# Patient Record
Sex: MALE | Race: WHITE | Employment: STUDENT | URBAN - METROPOLITAN AREA
[De-identification: names, ages, dates, MRNs, and addresses within clinical notes are randomized per-mention and may not be internally consistent; named-entity substitution may affect disease eponyms.]

---
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
## 2018-12-17 ENCOUNTER — OFFICE VISIT (OUTPATIENT)
Dept: ALLERGY | Facility: CLINIC | Age: 18
End: 2018-12-17
Payer: COMMERCIAL

## 2018-12-17 ENCOUNTER — NURSE ONLY (OUTPATIENT)
Dept: ALLERGY | Facility: CLINIC | Age: 18
End: 2018-12-17
Payer: COMMERCIAL

## 2018-12-17 VITALS
OXYGEN SATURATION: 97 % | SYSTOLIC BLOOD PRESSURE: 130 MMHG | HEART RATE: 92 BPM | DIASTOLIC BLOOD PRESSURE: 70 MMHG | TEMPERATURE: 96 F

## 2018-12-17 DIAGNOSIS — Z91.018 FOOD ALLERGY: ICD-10-CM

## 2018-12-17 DIAGNOSIS — T78.1XXA ADVERSE FOOD REACTION, INITIAL ENCOUNTER: Primary | ICD-10-CM

## 2018-12-17 PROCEDURE — 99204 OFFICE O/P NEW MOD 45 MIN: CPT | Performed by: ALLERGY & IMMUNOLOGY

## 2018-12-17 PROCEDURE — 99212 OFFICE O/P EST SF 10 MIN: CPT | Performed by: ALLERGY & IMMUNOLOGY

## 2018-12-17 PROCEDURE — 95004 PERQ TESTS W/ALRGNC XTRCS: CPT | Performed by: ALLERGY & IMMUNOLOGY

## 2018-12-17 NOTE — PATIENT INSTRUCTIONS
Recs: Handouts on food allergies versus food intolerances provided and reviewed  Continue with lactaid milk  as he is tolerating a by history  Reviewed alternatives including soy or almond milk  Reviewed other products by lactaid  including ice creams and

## 2018-12-17 NOTE — PROGRESS NOTES
Pamella Case is a 19year old male. HPI:   Patient presents with:  Food Allergy: Mother thinks he is lactose intolerant. PT reports if he has a glass of milk or ice cream he has diarrhea, stomach bloating, cramping.  Cheese doesn't bother him all the Outpatient Medications:  Fluticasone Propionate (FLONASE) 50 MCG/ACT Nasal Suspension 2 sprays by Nasal route daily.  Disp: 1 Bottle Rfl: 3       Allergies:  No Known Allergies      ROS:     Allergic/Immuno:  See HPI  Cardiovascular:  Negative for irregular encounter diagnosis)    Clinical history includes GI symptoms with milk. By history patient tolerates Lactaid milk without issues or reactions. No history of respiratory symptoms or rashes with dairy or milk    History suggest lactose intolerance.   Mom p

## 2019-05-31 ENCOUNTER — OFFICE VISIT (OUTPATIENT)
Dept: INTERNAL MEDICINE CLINIC | Facility: CLINIC | Age: 19
End: 2019-05-31
Payer: COMMERCIAL

## 2019-05-31 VITALS
OXYGEN SATURATION: 99 % | WEIGHT: 232 LBS | BODY MASS INDEX: 31.42 KG/M2 | HEIGHT: 72 IN | DIASTOLIC BLOOD PRESSURE: 88 MMHG | SYSTOLIC BLOOD PRESSURE: 124 MMHG | HEART RATE: 82 BPM | RESPIRATION RATE: 12 BRPM | TEMPERATURE: 99 F

## 2019-05-31 DIAGNOSIS — Z00.00 ANNUAL PHYSICAL EXAM: Primary | ICD-10-CM

## 2019-05-31 DIAGNOSIS — K13.0 MUCOCELE OF LIP: ICD-10-CM

## 2019-05-31 DIAGNOSIS — Z23 NEED FOR VACCINATION: ICD-10-CM

## 2019-05-31 PROCEDURE — 90651 9VHPV VACCINE 2/3 DOSE IM: CPT | Performed by: INTERNAL MEDICINE

## 2019-05-31 PROCEDURE — 99395 PREV VISIT EST AGE 18-39: CPT | Performed by: INTERNAL MEDICINE

## 2019-05-31 PROCEDURE — 90471 IMMUNIZATION ADMIN: CPT | Performed by: INTERNAL MEDICINE

## 2019-05-31 PROCEDURE — 90734 MENACWYD/MENACWYCRM VACC IM: CPT | Performed by: INTERNAL MEDICINE

## 2019-05-31 PROCEDURE — 90472 IMMUNIZATION ADMIN EACH ADD: CPT | Performed by: INTERNAL MEDICINE

## 2019-05-31 NOTE — PROGRESS NOTES
Radha Mcelroy is a 19year old malePatient presents with:  Establish Care: Pt would like to establish with PCP. Reports has not had a PCP and previous MD was Pediatrician. Bump: Pt c/o bumb on lip x3 months.  Reports he has attempted to ethan is demetrio equivalent per week    Drug use: Never          REVIEW OF SYSTEMS:   GENERAL: feels well otherwise  EYES:denies blurred vision or double vision  HEENT: denies nasal congestion, sinus pain, ST, or sore throat; reports cyst on lip  LUNGS: denies shortness of

## 2019-06-13 ENCOUNTER — OFFICE VISIT (OUTPATIENT)
Dept: OTOLARYNGOLOGY | Facility: CLINIC | Age: 19
End: 2019-06-13
Payer: COMMERCIAL

## 2019-06-13 VITALS
TEMPERATURE: 97 F | WEIGHT: 230 LBS | DIASTOLIC BLOOD PRESSURE: 81 MMHG | HEIGHT: 72 IN | BODY MASS INDEX: 31.15 KG/M2 | SYSTOLIC BLOOD PRESSURE: 121 MMHG

## 2019-06-13 DIAGNOSIS — K13.0 MUCOCELE OF LOWER LIP: Primary | ICD-10-CM

## 2019-06-13 PROCEDURE — 99203 OFFICE O/P NEW LOW 30 MIN: CPT | Performed by: OTOLARYNGOLOGY

## 2019-06-13 PROCEDURE — 99212 OFFICE O/P EST SF 10 MIN: CPT | Performed by: OTOLARYNGOLOGY

## 2019-06-13 NOTE — PROGRESS NOTES
Riki Arana is a 19year old male. Patient presents with:  Lesion: inside lower left lip for 5-6 months       HISTORY OF PRESENT ILLNESS  6/13/2019  Patient presents with a lesion located on his lower lip.   This was first noticed 6 Months ago and has GI Negative Abdominal pain and diarrhea. Endocrine Negative Cold intolerance and heat intolerance. Neuro Negative Tremors. Psych Negative Anxiety and depression. Integumentary Negative Frequent skin infections, pigment change and rash.    Hema/Lym sprays by Nasal route daily. , Disp: 1 Bottle, Rfl: 3  ASSESSMENT AND PLAN    1.  Mucocele of lower lip  Due to the large size I do recommend excision of this under local anesthesia in the operating room where I have access to bipolar cautery he agrees and w

## 2019-06-24 NOTE — H&P
Ivet Arevalo is a 19year old male. Patient presents with:  Lesion: inside lower left lip for 5-6 months         HISTORY OF PRESENT ILLNESS  6/13/2019  Patient presents with a lesion located on his lower lip.   This was first noticed 6 Months ago and ha irregular heartbeat/palpitations and syncope. GI Negative Abdominal pain and diarrhea. Endocrine Negative Cold intolerance and heat intolerance. Neuro Negative Tremors. Psych Negative Anxiety and depression.    Integumentary Negative Frequent skin i Fluticasone Propionate (FLONASE) 50 MCG/ACT Nasal Suspension, 2 sprays by Nasal route daily. , Disp: 1 Bottle, Rfl: 3  ASSESSMENT AND PLAN     1. Mucocele of lower lip  Due to the large size I do recommend excision of this under local anesthesia in the oper

## 2019-06-28 ENCOUNTER — HOSPITAL ENCOUNTER (OUTPATIENT)
Facility: HOSPITAL | Age: 19
Setting detail: HOSPITAL OUTPATIENT SURGERY
Discharge: HOME | End: 2019-06-28
Attending: OTOLARYNGOLOGY | Admitting: OTOLARYNGOLOGY
Payer: COMMERCIAL

## 2019-06-28 VITALS
DIASTOLIC BLOOD PRESSURE: 80 MMHG | RESPIRATION RATE: 16 BRPM | OXYGEN SATURATION: 99 % | BODY MASS INDEX: 31.15 KG/M2 | HEART RATE: 83 BPM | WEIGHT: 230 LBS | HEIGHT: 72 IN | SYSTOLIC BLOOD PRESSURE: 125 MMHG

## 2019-06-28 DIAGNOSIS — K13.0 DISEASES OF LIPS: ICD-10-CM

## 2019-06-28 PROCEDURE — 40814 EXCISE/REPAIR MOUTH LESION: CPT | Performed by: OTOLARYNGOLOGY

## 2019-06-28 PROCEDURE — 0HB1XZZ EXCISION OF FACE SKIN, EXTERNAL APPROACH: ICD-10-PCS | Performed by: OTOLARYNGOLOGY

## 2019-06-28 RX ORDER — LIDOCAINE HYDROCHLORIDE AND EPINEPHRINE 10; 10 MG/ML; UG/ML
INJECTION, SOLUTION INFILTRATION; PERINEURAL AS NEEDED
Status: DISCONTINUED | OUTPATIENT
Start: 2019-06-28 | End: 2019-06-28 | Stop reason: HOSPADM

## 2019-06-28 NOTE — INTERVAL H&P NOTE
Pre-op Diagnosis: Diseases of lips [K13.0]    The above referenced H&P was reviewed by Esteban Vivas MD on 6/28/2019, the patient was examined and no significant changes have occurred in the patient's condition since the H&P was performed.   I discussed wit

## 2019-06-28 NOTE — OPERATIVE REPORT
Bandar Haynes  DATE OF SURGERY: 6/28/2019  PREOPERATIVE DIAGNOSIS:   lip mucocele  POSTOPERATIVE DIAGNOSIS:  Same.   OPERATIVE PROCEDURE:      Excision and reconstruction of 3 cm lip lesion consistent with lip mucoceles

## 2019-06-29 ENCOUNTER — TELEPHONE (OUTPATIENT)
Dept: OTOLARYNGOLOGY | Facility: CLINIC | Age: 19
End: 2019-06-29

## 2019-07-01 NOTE — TELEPHONE ENCOUNTER
Post follow up lip mucocele lip repair,patient stated he is doing fine but said the sutures fell off , and it is open  Feeling numbness on the site ,patient denies any fever of bleeding ,FRANCOK made aware and advised pt to keep the site clean and dry avoid ru

## 2019-08-07 ENCOUNTER — TELEPHONE (OUTPATIENT)
Dept: INTERNAL MEDICINE CLINIC | Facility: CLINIC | Age: 19
End: 2019-08-07

## 2019-08-07 NOTE — TELEPHONE ENCOUNTER
S/w mother Laney Alfred. Informed that order for HPV #2 is in the system and that #3 is due 4 weeks after. Mother will have pt call back to schedule a nurse visit.

## 2019-08-07 NOTE — TELEPHONE ENCOUNTER
Pt. Is due for his second HPV vaccine mom wants to know if there's an order already placed ph.  # [phone]   Routed to clinical

## 2019-08-13 ENCOUNTER — NURSE ONLY (OUTPATIENT)
Dept: INTERNAL MEDICINE CLINIC | Facility: CLINIC | Age: 19
End: 2019-08-13
Payer: COMMERCIAL

## 2019-08-13 DIAGNOSIS — Z23 NEED FOR HPV VACCINE: Primary | ICD-10-CM

## 2019-08-13 PROCEDURE — 90651 9VHPV VACCINE 2/3 DOSE IM: CPT | Performed by: INTERNAL MEDICINE

## 2019-08-13 PROCEDURE — 90471 IMMUNIZATION ADMIN: CPT | Performed by: INTERNAL MEDICINE

## 2019-08-13 NOTE — PROGRESS NOTES
Pt here today for 2nd/Final HPV Vaccine    Pt verfied name and     Pt tolerated injection well     Vaccine Record printed for pt

## 2019-12-23 ENCOUNTER — TELEPHONE (OUTPATIENT)
Dept: INTERNAL MEDICINE CLINIC | Facility: CLINIC | Age: 19
End: 2019-12-23

## 2019-12-23 ENCOUNTER — OFFICE VISIT (OUTPATIENT)
Dept: INTERNAL MEDICINE CLINIC | Facility: CLINIC | Age: 19
End: 2019-12-23
Payer: COMMERCIAL

## 2019-12-23 VITALS
DIASTOLIC BLOOD PRESSURE: 108 MMHG | SYSTOLIC BLOOD PRESSURE: 140 MMHG | TEMPERATURE: 99 F | OXYGEN SATURATION: 99 % | HEIGHT: 72 IN | WEIGHT: 255 LBS | BODY MASS INDEX: 34.54 KG/M2 | HEART RATE: 102 BPM

## 2019-12-23 DIAGNOSIS — Z00.00 ANNUAL PHYSICAL EXAM: ICD-10-CM

## 2019-12-23 DIAGNOSIS — Z11.3 SCREENING EXAMINATION FOR STD (SEXUALLY TRANSMITTED DISEASE): Primary | ICD-10-CM

## 2019-12-23 PROCEDURE — 99213 OFFICE O/P EST LOW 20 MIN: CPT | Performed by: INTERNAL MEDICINE

## 2019-12-23 PROCEDURE — 90651 9VHPV VACCINE 2/3 DOSE IM: CPT | Performed by: INTERNAL MEDICINE

## 2019-12-23 PROCEDURE — 90471 IMMUNIZATION ADMIN: CPT | Performed by: INTERNAL MEDICINE

## 2019-12-23 RX ORDER — DOXYCYCLINE HYCLATE 100 MG/1
100 CAPSULE ORAL 2 TIMES DAILY
Qty: 42 CAPSULE | Refills: 0 | Status: SHIPPED | OUTPATIENT
Start: 2019-12-23 | End: 2020-01-13

## 2019-12-23 RX ORDER — AZITHROMYCIN 500 MG/1
TABLET, FILM COATED ORAL
Qty: 4 TABLET | Refills: 0 | Status: SHIPPED | OUTPATIENT
Start: 2019-12-23 | End: 2019-12-23

## 2019-12-23 NOTE — PROGRESS NOTES
Justina Flor is a 22year old male. Patient presents with:  STD: Would like STD testing       HPI:   Justina Flor is a 22year old male who presents for: STD testing    Has noticed a few spots on the base of his penis.  They are small, sometimes like a Types: 1 Standard drinks or equivalent per week      Comment: social    Drug use: Never         REVIEW OF SYSTEMS:   GENERAL: feels well otherwise  : reports ED, rash. Denies penile discharge, pain, dysuria, urinary frequency.        EXAM:   BP (!) 140/10

## 2019-12-23 NOTE — TELEPHONE ENCOUNTER
Please call pharmacy-cancel azithromycin rx; instead would like him to have doxycycline bid x 21 days (rx was sent already)

## 2019-12-23 NOTE — TELEPHONE ENCOUNTER
Spoke to pharmacy and relayed Dr Elmer Blackwood message. They only show the doxycyline rx. Verified RX. They will fill doxycycline.

## 2019-12-24 ENCOUNTER — LAB ENCOUNTER (OUTPATIENT)
Dept: LAB | Age: 19
End: 2019-12-24
Attending: INTERNAL MEDICINE
Payer: COMMERCIAL

## 2019-12-24 DIAGNOSIS — Z11.3 SCREENING EXAMINATION FOR STD (SEXUALLY TRANSMITTED DISEASE): ICD-10-CM

## 2019-12-24 DIAGNOSIS — Z00.00 ANNUAL PHYSICAL EXAM: ICD-10-CM

## 2019-12-24 PROCEDURE — 87591 N.GONORRHOEAE DNA AMP PROB: CPT

## 2019-12-24 PROCEDURE — 80061 LIPID PANEL: CPT

## 2019-12-24 PROCEDURE — 36415 COLL VENOUS BLD VENIPUNCTURE: CPT

## 2019-12-24 PROCEDURE — 87389 HIV-1 AG W/HIV-1&-2 AB AG IA: CPT

## 2019-12-24 PROCEDURE — 86780 TREPONEMA PALLIDUM: CPT

## 2019-12-24 PROCEDURE — 87491 CHLMYD TRACH DNA AMP PROBE: CPT

## 2019-12-24 PROCEDURE — 82947 ASSAY GLUCOSE BLOOD QUANT: CPT

## 2019-12-26 ENCOUNTER — TELEPHONE (OUTPATIENT)
Dept: INTERNAL MEDICINE CLINIC | Facility: CLINIC | Age: 19
End: 2019-12-26

## 2019-12-26 NOTE — TELEPHONE ENCOUNTER
Dr. Gretel Pedraza, I spoke with patient and he feels about the same but thinks that rash had improved. He asks what it could have been because his girlfriend is going to get tested. He says he is still having erectile dysfunction issues.  He says he will work on TONE Matos

## 2019-12-26 NOTE — TELEPHONE ENCOUNTER
Please notify patient that his STD testing was normal; glucose and cholesterol was normal.     How is he feeling?

## 2019-12-26 NOTE — TELEPHONE ENCOUNTER
Likely a bacterial infection---like I said, this is not something that we can test for in blood or urine tests. It shows up as a rash. Nonetheless, she should be evaluated by her doctor.      In terms of the erectile dysfunction, would recommend that he c

## 2021-03-11 ENCOUNTER — TELEPHONE (OUTPATIENT)
Dept: PEDIATRICS CLINIC | Facility: CLINIC | Age: 21
End: 2021-03-11

## 2021-03-11 NOTE — TELEPHONE ENCOUNTER
Mom needs to know when patient received his last tetanus shot. He cut his finger while away at school and is getting it stitched up now. They would like to know.

## 2021-03-11 NOTE — TELEPHONE ENCOUNTER
Patient transferred to triage   TDap vaccine date 5/18/2020     Information provided to patient. No further concerns.

## 2021-03-11 NOTE — TELEPHONE ENCOUNTER
No VIK on file to speak with parent   Mom provided patient's contact info. Call attempt to patient, message left.  requested callback

## 2021-04-01 ENCOUNTER — TELEPHONE (OUTPATIENT)
Dept: INTERNAL MEDICINE CLINIC | Facility: CLINIC | Age: 21
End: 2021-04-01

## 2021-04-01 NOTE — TELEPHONE ENCOUNTER
Spoke with pt's mother, advised PCP physical exam, understanding verbalized. Call transferred to  for appt. To Dr. Manisha braxton - pt has appt with you 4/2/21 11:00AM.  Pt last seen here 12/23/19.

## 2021-04-01 NOTE — TELEPHONE ENCOUNTER
Are we able to schedule him for 12:00 for for 1 hour examination for his physical, we can focus on his ADHD symptoms as part of the physical.

## 2021-04-01 NOTE — TELEPHONE ENCOUNTER
Patient's daughter Fortino Aguilar is calling patient is away at college  He called her and said he feels he may have ADHD  Patient feels he needs to see someone for this    What specialist should the patient see?   Please call Fortino Aguilar [phone]

## 2021-04-02 ENCOUNTER — OFFICE VISIT (OUTPATIENT)
Dept: INTERNAL MEDICINE CLINIC | Facility: CLINIC | Age: 21
End: 2021-04-02
Payer: COMMERCIAL

## 2021-04-02 ENCOUNTER — LAB ENCOUNTER (OUTPATIENT)
Dept: LAB | Age: 21
End: 2021-04-02
Attending: INTERNAL MEDICINE
Payer: COMMERCIAL

## 2021-04-02 VITALS
BODY MASS INDEX: 35 KG/M2 | HEART RATE: 91 BPM | TEMPERATURE: 98 F | RESPIRATION RATE: 16 BRPM | OXYGEN SATURATION: 99 % | SYSTOLIC BLOOD PRESSURE: 124 MMHG | DIASTOLIC BLOOD PRESSURE: 84 MMHG | WEIGHT: 260 LBS

## 2021-04-02 DIAGNOSIS — Z13.1 SCREENING FOR DIABETES MELLITUS: ICD-10-CM

## 2021-04-02 DIAGNOSIS — R63.1 POLYDIPSIA: ICD-10-CM

## 2021-04-02 DIAGNOSIS — F32.9 REACTIVE DEPRESSION: ICD-10-CM

## 2021-04-02 DIAGNOSIS — Z00.00 ANNUAL PHYSICAL EXAM: Primary | ICD-10-CM

## 2021-04-02 DIAGNOSIS — F41.9 ANXIETY: ICD-10-CM

## 2021-04-02 DIAGNOSIS — Z00.00 ANNUAL PHYSICAL EXAM: ICD-10-CM

## 2021-04-02 DIAGNOSIS — F90.0 ATTENTION DEFICIT HYPERACTIVITY DISORDER (ADHD), PREDOMINANTLY INATTENTIVE TYPE: ICD-10-CM

## 2021-04-02 PROCEDURE — 99395 PREV VISIT EST AGE 18-39: CPT | Performed by: INTERNAL MEDICINE

## 2021-04-02 PROCEDURE — 83036 HEMOGLOBIN GLYCOSYLATED A1C: CPT

## 2021-04-02 PROCEDURE — 3074F SYST BP LT 130 MM HG: CPT | Performed by: INTERNAL MEDICINE

## 2021-04-02 PROCEDURE — 3079F DIAST BP 80-89 MM HG: CPT | Performed by: INTERNAL MEDICINE

## 2021-04-02 PROCEDURE — 96127 BRIEF EMOTIONAL/BEHAV ASSMT: CPT | Performed by: INTERNAL MEDICINE

## 2021-04-02 PROCEDURE — 85025 COMPLETE CBC W/AUTO DIFF WBC: CPT

## 2021-04-02 PROCEDURE — 84443 ASSAY THYROID STIM HORMONE: CPT

## 2021-04-02 PROCEDURE — 80053 COMPREHEN METABOLIC PANEL: CPT

## 2021-04-02 PROCEDURE — 36415 COLL VENOUS BLD VENIPUNCTURE: CPT

## 2021-04-02 NOTE — PATIENT INSTRUCTIONS
He was seen in clinic for annual physical examination. We did focus on your symptoms that are consistent with ADHD. We also discussed a little bit about your depressed mood, which seems to be due to your difficulties with your ADHD symptoms.     Our goal the clinic in 3 to 6 months for follow-up appointment.

## 2021-04-02 NOTE — H&P
Jerilyn Chilel is a 23year old male. HPI:   Patient presents with:  Physical: Pt would like to discuss possible dx of ADD as he has noted difficulty concentrating, impulsive and self destructive behaviors.  Also reports trouble sitting still and diffic past year. Feels like he his hypersensitive to touch and temperature. Has an eye twitch, as it seems like he is rolling his eyes.      As a kid, he was in special classes for reading in the past. Going through high school and college, didn't really get good COMPLETE  11/19/2013 19:22:27    scanned to media tab:   Isaiah Fidel [id number]; DOS 11-   • EXCISION LESION HEAD/NECK/FACE N/A 6/28/2019    Performed by Raudel Escalera MD at [facility] MAIN OR      Family History   Problem Relation Age of Onset   • Ara Mares Nail changes  Neuro: Weakness, Numbness, Paresthesias, Loss of Consciousness, Syncope, Dizziness, Headache, Falls  Psych:  Anxiety, Depression, Insomnia, Suicidal Ideation, Homicidal ideation, Memory Changes  Heme/Lymph: Bruising, Bleeding, Lymphadenopathy Latest Ref Range: 0.358 - 3.740 mIU/mL 2.190         ASSESSMENT/PLAN:       Mr. Keisha Sanchez is a 23year old male coming in for physical examination.     ADHD  History as above in HPI, in summary patient has had difficulty with concentration, focus, completing ta with depression, if this is a concomitant comorbid condition independent of ADHD. At that time, he would warrant therapy and possible antidepressant medication    Insomnia  Unclear if primary insomnia, or also due to his anxiety.   He sleeps about 4 hours Td/Tdap: Last Tdap assess at next visit  Zoster (60+):  Not indicated  HPV (19-26): UTD  Pneumococcal: Not indicated    Immunization History   Administered Date(s) Administered   • DTAP 12/07/1998, 02/10/1999, 04/13/1999, 04/26/2000, 10/28/2004   • HEP B/

## 2021-04-03 PROBLEM — F41.9 ANXIETY: Status: ACTIVE | Noted: 2021-04-03

## 2021-04-03 PROBLEM — F90.0 ATTENTION DEFICIT HYPERACTIVITY DISORDER (ADHD), PREDOMINANTLY INATTENTIVE TYPE: Status: ACTIVE | Noted: 2021-04-03

## 2021-04-03 PROBLEM — F32.9 REACTIVE DEPRESSION: Status: ACTIVE | Noted: 2021-04-03

## 2021-04-05 ENCOUNTER — TELEPHONE (OUTPATIENT)
Dept: INTERNAL MEDICINE CLINIC | Facility: CLINIC | Age: 21
End: 2021-04-05

## 2021-04-05 NOTE — TELEPHONE ENCOUNTER
Patient called back, tried to transfer call to triage. Phone call went straight to voicemail. Informed patient that nursing will have to call him back later.      [facility] # [phone]

## 2021-04-05 NOTE — TELEPHONE ENCOUNTER
Spoke with patient to relay MD message below; Patient verbalized understanding.  Pt did have concerns with high kidney function numbers as he states he drinks about a gallon of water and probably more since starting the Vyvanse in which we discuss dry mouth

## 2021-04-05 NOTE — TELEPHONE ENCOUNTER
Please notify the patient that I reviewed his blood work from 4/2/2021    A1c: Normal, no diabetes    Thyroid, blood counts within normal limits    Chemistry panel: Kidney function is slightly high, we should have him take aggressive oral hydration with wa

## 2021-04-06 NOTE — TELEPHONE ENCOUNTER
Patients son called just to verify that it is ok to talk to his mom about his lab results. Patients mom in on the verbal release.

## 2021-04-06 NOTE — TELEPHONE ENCOUNTER
2 energy drinks 300 mg each of caffeine, 600 a day for the past 2 years on a frequent basis. Prior to his blood draw, he did have 2 energy drinks as he only slept 1 hour prior to driving down from [place].     His ALT at 94 and creatinine 1.60 possibly re

## 2021-04-06 NOTE — TELEPHONE ENCOUNTER
Patients mom(Janelle) called and would like a copy of her sons latest blood work. She would like to  by Friday.     Please call to let her know when ready for     [phone]

## 2021-04-22 ENCOUNTER — TELEPHONE (OUTPATIENT)
Dept: INTERNAL MEDICINE CLINIC | Facility: CLINIC | Age: 21
End: 2021-04-22

## 2021-04-22 NOTE — TELEPHONE ENCOUNTER
Called patient regarding condition update with new start Vyvanse 30 mg    Left voicemail at mobile [phone]. I asked for a condition update regarding the Vyvanse. I did speak with the patient. For the first 3 days, the Vyvanse really helped.  He was

## 2021-04-27 ENCOUNTER — TELEPHONE (OUTPATIENT)
Dept: INTERNAL MEDICINE CLINIC | Facility: CLINIC | Age: 21
End: 2021-04-27

## 2021-04-27 NOTE — TELEPHONE ENCOUNTER
Patient is calling his school is requiring a letter stating he has been diagnosed with ADHD  Since he was prescribed Vyvanse they need a letter    Patient would like this added to Maggi    Tasked to nursing

## 2021-04-27 NOTE — TELEPHONE ENCOUNTER
Please notify the patient that I sent a letter through my chart. He should review it and make sure that it is appropriate to submit to his school.     Under Letters or Communications tab

## 2021-04-27 NOTE — TELEPHONE ENCOUNTER
Spoke with patient to relay MD message below; Patient verbalized understanding. Pt did not have any questions or concerns at this time. He did review the letter while on phone and states that is al he needs.

## 2021-05-13 ENCOUNTER — TELEPHONE (OUTPATIENT)
Dept: INTERNAL MEDICINE CLINIC | Facility: CLINIC | Age: 21
End: 2021-05-13

## 2021-05-13 NOTE — TELEPHONE ENCOUNTER
Received outside hospital records from [address] [place], [facility]    On 5/7/2021, patient presents to the emergency department at [address] with anxiety and shortness of breath.   He was in his Zoom call when he was suppo

## 2021-05-14 NOTE — TELEPHONE ENCOUNTER
Sent records for scanning dept - Records will be in pts EMR under Media Tab in the next upcoming days.

## 2021-05-18 ENCOUNTER — TELEPHONE (OUTPATIENT)
Dept: INTERNAL MEDICINE CLINIC | Facility: CLINIC | Age: 21
End: 2021-05-18

## 2021-05-18 NOTE — TELEPHONE ENCOUNTER
Mother, Fortino Aguilar, stopped in today to drop off release of information forms to be faxed to two different locations.      One Release of Information Request:  From [facility].  Fax #   Ph # [phone]    Second Release of I

## 2021-05-21 ENCOUNTER — TELEPHONE (OUTPATIENT)
Dept: INTERNAL MEDICINE CLINIC | Facility: CLINIC | Age: 21
End: 2021-05-21

## 2021-05-21 NOTE — TELEPHONE ENCOUNTER
I reviewed the outside records that were sent to me:    Patient was transferred to [address] system for pneumomediastinum and pneumopericardium.   Outside hospital was unable to perform a barium esophagram for possible esophageal perforation, thus h

## 2021-05-24 ENCOUNTER — TELEPHONE (OUTPATIENT)
Dept: INTERNAL MEDICINE CLINIC | Facility: CLINIC | Age: 21
End: 2021-05-24

## 2021-05-24 NOTE — TELEPHONE ENCOUNTER
To MD:  The above refill request is for a controlled substance. Please review pended medication order. Print and sign for staff to fax to pharmacy or prescribe electronically. 90 day pended.      Vyvanse 40 mg Q day     Last office visit: 4/2/2021

## 2021-05-24 NOTE — TELEPHONE ENCOUNTER
Patient is calling to request a refill on his Vyvanse. Patient states he was put on the medication two months ago with a dosage of 30 which was then increased to 40 mg. Patient states the 40 mg is working well. Patient is hoping to have this done today.

## 2021-05-24 NOTE — TELEPHONE ENCOUNTER
Disc included in records. Attempted to call medical records to determine if they are able to take a disc. Unable to contact medical records due to being closed at this time. Disc left at Dr. Adan Rodriguez nurse desk. Other records sent to scanning.

## 2021-05-25 NOTE — PATIENT INSTRUCTIONS
You were seen in clinic for posthospitalization follow-up after pneumo mediastinum and pneumopericardium at [facility]. Thankfully, all work-up returned negative and you had improved clinically.   Because of your ongoing symptoms, we do recommend a repeat

## 2021-05-25 NOTE — PROGRESS NOTES
Chief Complaint:   Patient presents with: Follow - Up: reports that he still feels like theres air going into his neck.      HPI:     Mr. Antonio Lloyd is a 23year old male past medical history of ADHD, hemorrhoids who presents today for posthospitalization follo esophagram for possible esophageal perforation, thus he was transferred to [facility] for thoracic surgery consultation. He was transferred on room air without distress.   He had subcutaneous emphysema which has improved, though still having some in bilateral [facility] Fracture of humerus, left, closed 2012    healed   • Hemorrhoids    • Knee effusion, right 09/08/2011   • Superficial bruising of lower leg 09/08/2011     Past Surgical History:   Procedure Laterality Date   • ELECTROCARDIOGRAM, COMPLETE  11/19/2013 19:22: Cough, Sputum, Wheezing, Shortness of breath  Gastrointestinal: Nausea, Vomiting, Diarrhea, Constipation, Pain, Heartburn, Dysphagia, Bloody stools, Tarry stools  Genitourinary: Dysmenorrhea, Dysuria, Urinary Frequency, Hematuria, Urinary Incontinence, Urg HPI      ASSESSMENT AND PLAN:       Mr. Manish Ramirez is a 23year old male past medical history of ADHD, hemorrhoids who presents today for posthospitalization follow-up.     Pneumomediastinum  Pneumopericardium  Work-up reviewed as above, unclear cause of his pne drinks    ADHD  -Continued on Vyvanse for now at 40 mg  -I discussed with him that Vyvanse can be contributing to his blood pressure, for which we should discuss the risks and benefits for continuing Vyvanse.          Orders This Visit:  Orders Placed This

## 2021-05-25 NOTE — TELEPHONE ENCOUNTER
Michelle Kent for condition:    There was no definitive etiology of the pneumomediastinum. No signs of it before. He still feels like air is coming back into. He is starting to get a pressure in the throat, but not as bad as the initial presentation.

## 2021-05-26 ENCOUNTER — OFFICE VISIT (OUTPATIENT)
Dept: INTERNAL MEDICINE CLINIC | Facility: CLINIC | Age: 21
End: 2021-05-26
Payer: COMMERCIAL

## 2021-05-26 VITALS
TEMPERATURE: 99 F | WEIGHT: 256 LBS | HEART RATE: 101 BPM | BODY MASS INDEX: 34.67 KG/M2 | DIASTOLIC BLOOD PRESSURE: 88 MMHG | SYSTOLIC BLOOD PRESSURE: 152 MMHG | OXYGEN SATURATION: 98 % | HEIGHT: 72 IN

## 2021-05-26 DIAGNOSIS — R03.0 ELEVATED BLOOD PRESSURE READING: ICD-10-CM

## 2021-05-26 DIAGNOSIS — J98.2 PNEUMOMEDIASTINUM (HCC): Primary | ICD-10-CM

## 2021-05-26 DIAGNOSIS — I31.9 PNEUMOPERICARDIUM: ICD-10-CM

## 2021-05-26 PROCEDURE — 3079F DIAST BP 80-89 MM HG: CPT | Performed by: INTERNAL MEDICINE

## 2021-05-26 PROCEDURE — 3008F BODY MASS INDEX DOCD: CPT | Performed by: INTERNAL MEDICINE

## 2021-05-26 PROCEDURE — 3077F SYST BP >= 140 MM HG: CPT | Performed by: INTERNAL MEDICINE

## 2021-05-26 PROCEDURE — 99215 OFFICE O/P EST HI 40 MIN: CPT | Performed by: INTERNAL MEDICINE

## 2021-05-26 NOTE — TELEPHONE ENCOUNTER
Spoke with the medical records department who confirmed that they do accept CD's/disk. Since the CD is at our office, we can either mail it directly to [facility]; attn: Medical Records or drop it off. Dr. Ivey  will drop off disk.  MD has it in hi

## 2021-05-27 ENCOUNTER — TELEPHONE (OUTPATIENT)
Dept: INTERNAL MEDICINE CLINIC | Facility: CLINIC | Age: 21
End: 2021-05-27

## 2021-05-27 ENCOUNTER — HOSPITAL ENCOUNTER (OUTPATIENT)
Dept: GENERAL RADIOLOGY | Facility: HOSPITAL | Age: 21
Discharge: HOME | End: 2021-05-27
Attending: INTERNAL MEDICINE
Payer: COMMERCIAL

## 2021-05-27 DIAGNOSIS — I31.9 PNEUMOPERICARDIUM: ICD-10-CM

## 2021-05-27 DIAGNOSIS — J98.2 PNEUMOMEDIASTINUM (HCC): ICD-10-CM

## 2021-05-27 PROCEDURE — 71046 X-RAY EXAM CHEST 2 VIEWS: CPT | Performed by: INTERNAL MEDICINE

## 2021-05-27 NOTE — TELEPHONE ENCOUNTER
Please notify the patient I reviewed his two-view chest x-ray from 5/27/2021:    No air was noted around the esophagus or heart. It was resulted as normal examination. This is good news as the area seems to have resolved from the last hospitalization.

## 2021-06-05 ENCOUNTER — LAB ENCOUNTER (OUTPATIENT)
Dept: LAB | Facility: HOSPITAL | Age: 21
End: 2021-06-05
Attending: INTERNAL MEDICINE
Payer: COMMERCIAL

## 2021-06-05 DIAGNOSIS — R03.0 ELEVATED BLOOD PRESSURE READING: ICD-10-CM

## 2021-06-05 PROCEDURE — 84244 ASSAY OF RENIN: CPT

## 2021-06-05 PROCEDURE — 82533 TOTAL CORTISOL: CPT

## 2021-06-05 PROCEDURE — 82088 ASSAY OF ALDOSTERONE: CPT

## 2021-06-05 PROCEDURE — 85025 COMPLETE CBC W/AUTO DIFF WBC: CPT

## 2021-06-05 PROCEDURE — 80053 COMPREHEN METABOLIC PANEL: CPT

## 2021-06-05 PROCEDURE — 36415 COLL VENOUS BLD VENIPUNCTURE: CPT

## 2021-06-05 PROCEDURE — 81001 URINALYSIS AUTO W/SCOPE: CPT

## 2021-06-14 ENCOUNTER — PATIENT MESSAGE (OUTPATIENT)
Dept: INTERNAL MEDICINE CLINIC | Facility: CLINIC | Age: 21
End: 2021-06-14

## 2021-06-14 DIAGNOSIS — R79.89 ELEVATED LFTS: Primary | ICD-10-CM

## 2021-06-15 NOTE — TELEPHONE ENCOUNTER
I reviewed the blood work from 6/5/2021    CMP: AST 41, ALT 91; notable potassium 3.7 normal; BUN and creatinine have improved to normal range  CBC within normal limits  Renin 4.1–0.1 above the reference range  –Aldosterone 11.4, within normal limits  –Cor

## 2021-06-15 NOTE — TELEPHONE ENCOUNTER
From: Riki Arana  To: Mendel Nagy MD  Sent: 6/14/2021 6:13 PM CDT  Subject: Test Results Tika Daley,    What do you think about my lab results? Are the liver numbers pretty bad?  Give me a call if you can to talk about them [phone]

## 2021-06-29 NOTE — TELEPHONE ENCOUNTER
Current refill request refused due to refill is either a duplicate request or has active refills at the pharmacy. Check previous templates.     Requested Prescriptions     Refused Prescriptions Disp Refills   • Lisdexamfetamine Dimesylate (VYVANSE) 40 MG O

## 2021-08-24 ENCOUNTER — TELEPHONE (OUTPATIENT)
Dept: INTERNAL MEDICINE CLINIC | Facility: CLINIC | Age: 21
End: 2021-08-24

## 2021-08-25 NOTE — TELEPHONE ENCOUNTER
To MD:  The above refill request is for a controlled substance. Please review pended medication order. Print and sign for staff to fax to pharmacy or prescribe electronically.     Last office visit: 5/26/2021 ()  Last time refill sent and quanti

## 2021-09-24 ENCOUNTER — TELEPHONE (OUTPATIENT)
Dept: INTERNAL MEDICINE CLINIC | Facility: CLINIC | Age: 21
End: 2021-09-24

## 2021-09-24 NOTE — TELEPHONE ENCOUNTER
Pt. Called stating his VyVanse is due to be refilled tomorrow. He is leaving for out of town today and Pharmacy won't fill today without permission from Dr. Juliana Bonilla. Pt. askinf if we can call [facility] on [address] and ok the refill for today.   Pt. Can be reac

## 2021-10-19 ENCOUNTER — PATIENT MESSAGE (OUTPATIENT)
Dept: INTERNAL MEDICINE CLINIC | Facility: CLINIC | Age: 21
End: 2021-10-19

## 2021-10-20 RX ORDER — LISDEXAMFETAMINE DIMESYLATE CAPSULES 40 MG/1
40 CAPSULE ORAL DAILY
Qty: 30 CAPSULE | Refills: 0 | OUTPATIENT
Start: 2021-10-20 | End: 2021-11-19

## 2021-10-20 RX ORDER — BUPROPION HYDROCHLORIDE 150 MG/1
TABLET ORAL
Qty: 90 TABLET | Refills: 3 | Status: SHIPPED | OUTPATIENT
Start: 2021-10-20 | End: 2022-01-20

## 2021-10-20 NOTE — TELEPHONE ENCOUNTER
Addressed in [facility] message today. Therefore, refill is not appropriate. After 3 days, can then start the Wellbutrin which I had sent earlier today.

## 2021-10-20 NOTE — TELEPHONE ENCOUNTER
To Dr Michelet Wilson to please clarify  It looks like this refill request for vyvanse came in yesterday  I see you gave patient instructions today about vyvanse and bupropion     Pt already should have a refill on file at pharmacy for vyvanse from 90 day panel eRXd by Dr Subha Rowan with fill date of 10/25  Should that be cancelled? Pt supposed to stop vyvanse altogether?

## 2021-10-20 NOTE — TELEPHONE ENCOUNTER
From: Kayleigh Mckay  To: Shahram Terrell MD  Sent: 10/19/2021 11:34 PM CDT  Subject: Vyvanse prescription     Hey Dr Radha Deluna been on the 40mg of Vyvanse for a few months now, and the effects have diminished if not gotten worse.      I started trevor

## 2021-10-20 NOTE — TELEPHONE ENCOUNTER
Washout Vyvanse over the next 3 days. Then start Wellbutrin that I sent over to the pharmacy per my chart note.

## 2022-01-19 NOTE — PROGRESS NOTES
Chief Complaint:   Patient presents with:   Follow - Up: pt here for general check up in regards to medication management   Covid: pt c/o fatigueness since getting his covid vaccine    HPI:     Mr. Helga Clement is a 19year old male PMHX ADHD, hemorrhoids coming i Procedure Laterality Date   • ELECTROCARDIOGRAM, COMPLETE  11/19/2013 19:22:27    scanned to media tab:   Argelia Dejesus 39-; DOS 11-     Social History:  Social History    Tobacco Use      Smoking status: Never Smoker      Smokeless tobac Numbness, Paresthesias, Loss of Consciousness, Syncope, Dizziness, Headache, Falls  Psych:  Anxiety, Depression, Insomnia, Suicidal Ideation, Homicidal ideation, Memory Changes  Heme/Lymph: Bruising, Bleeding, Lymphadenopathy  Endocrine: Polyuria, Polydipsi 37 U/L    Alkaline Phosphatase 74 45 - 117 U/L    Bilirubin, Total 0.9 0.1 - 2.0 mg/dL    Total Protein 7.8 6.4 - 8.2 g/dL    Albumin 4.0 3.4 - 5.0 g/dL    Globulin  3.8 2.8 - 4.4 g/dL    A/G Ratio 1.1 1.0 - 2.0    FASTING Yes      *Note: Due to a large nu possible inadequate control of ADHD.   - Will perfor a trial of reduced Wellbutrin 150 mg once a day  - Continue with conservative management of frequent reminders such as taking notes ad setting alarms    Anxiety and Depression  Worsening over the last few secondary work-up.  -Check blood pressure twice a day at various points during the day to evaluate for possible whitecoat hypertension  - Secondary work-up unremarkable  - BP today at goal today     Elevated creatinine  Improved on the inpatient hospitaliz

## 2022-01-19 NOTE — PATIENT INSTRUCTIONS
You were seen in clinic today for follow-up of your anxiety and ADHD symptoms. Today, we discussed the ongoing use of Wellbutrin. We recommended:    -Reducing the Wellbutrin dose to 1 tablet (150 mg) once a day.   The hope here is that the memory issues i

## 2022-02-23 ENCOUNTER — TELEPHONE (OUTPATIENT)
Dept: INTERNAL MEDICINE CLINIC | Facility: CLINIC | Age: 22
End: 2022-02-23

## 2022-02-24 RX ORDER — BUPROPION HYDROCHLORIDE 150 MG/1
TABLET ORAL
Qty: 30 TABLET | Refills: 0 | OUTPATIENT
Start: 2022-02-24

## 2022-02-24 RX ORDER — BUPROPION HYDROCHLORIDE 150 MG/1
150 TABLET ORAL DAILY
Qty: 30 TABLET | Refills: 11 | Status: SHIPPED | OUTPATIENT
Start: 2022-02-24

## 2022-02-24 NOTE — TELEPHONE ENCOUNTER
To Mario Rivas for consult, as this dose was just adjusted post LOV 1/20/22 (lowered from BID to 1 tab every day)     S/w pt - he states \"I'm feeling the best I have felt in a long time\" denies anymore s/e, states irritability has gotten much better and his memory has improved. Pt wishes to stay on this dose. Please advise if ok to cont.     Also Last alt 91 ast 41 on 6/5/21

## 2023-10-30 ENCOUNTER — TELEPHONE (OUTPATIENT)
Dept: INTERNAL MEDICINE CLINIC | Facility: CLINIC | Age: 23
End: 2023-10-30

## 2023-10-30 DIAGNOSIS — Z00.00 ANNUAL PHYSICAL EXAM: ICD-10-CM

## 2023-10-30 DIAGNOSIS — R79.89 ELEVATED LFTS: Primary | ICD-10-CM

## 2023-10-30 NOTE — TELEPHONE ENCOUNTER
Patient calling to request blood work prior to:    [x] physical    [] check-up      [] other    Patient uses:  [x] [facility] [] [facility]       [facility] location:       Fax #:    Patient prefers to be notified once labs are placed by: [x] phone call  [] my chart message     Please call patient mother when order in place    Tasked to nursing

## 2023-11-14 ENCOUNTER — LAB ENCOUNTER (OUTPATIENT)
Dept: LAB | Facility: HOSPITAL | Age: 23
End: 2023-11-14
Attending: INTERNAL MEDICINE
Payer: COMMERCIAL

## 2023-11-14 DIAGNOSIS — R79.89 ELEVATED LFTS: ICD-10-CM

## 2023-11-14 DIAGNOSIS — Z00.00 ANNUAL PHYSICAL EXAM: ICD-10-CM

## 2023-11-14 LAB
ALBUMIN SERPL-MCNC: 4.6 G/DL (ref 3.2–4.8)
ALBUMIN/GLOB SERPL: 1.5 {RATIO} (ref 1–2)
ALP LIVER SERPL-CCNC: 75 U/L
ALT SERPL-CCNC: 76 U/L
ANION GAP SERPL CALC-SCNC: 8 MMOL/L (ref 0–18)
AST SERPL-CCNC: 30 U/L (ref ?–34)
BASOPHILS # BLD AUTO: 0.07 X10(3) UL (ref 0–0.2)
BASOPHILS NFR BLD AUTO: 1.1 %
BILIRUB SERPL-MCNC: 0.6 MG/DL (ref 0.3–1.2)
BUN BLD-MCNC: 17 MG/DL (ref 9–23)
BUN/CREAT SERPL: 15.2 (ref 10–20)
CALCIUM BLD-MCNC: 9.9 MG/DL (ref 8.7–10.4)
CHLORIDE SERPL-SCNC: 105 MMOL/L (ref 98–112)
CHOLEST SERPL-MCNC: 153 MG/DL (ref ?–200)
CO2 SERPL-SCNC: 29 MMOL/L (ref 21–32)
CREAT BLD-MCNC: 1.12 MG/DL
DEPRECATED RDW RBC AUTO: 34.4 FL (ref 35.1–46.3)
EGFRCR SERPLBLD CKD-EPI 2021: 93 ML/MIN/1.73M2 (ref 60–?)
EOSINOPHIL # BLD AUTO: 0.15 X10(3) UL (ref 0–0.7)
EOSINOPHIL NFR BLD AUTO: 2.3 %
ERYTHROCYTE [DISTWIDTH] IN BLOOD BY AUTOMATED COUNT: 11.6 % (ref 11–15)
FASTING PATIENT LIPID ANSWER: YES
FASTING STATUS PATIENT QL REPORTED: YES
GLOBULIN PLAS-MCNC: 3.1 G/DL (ref 2.8–4.4)
GLUCOSE BLD-MCNC: 85 MG/DL (ref 70–99)
HCT VFR BLD AUTO: 46 %
HDLC SERPL-MCNC: 52 MG/DL (ref 40–59)
HGB BLD-MCNC: 16 G/DL
IMM GRANULOCYTES # BLD AUTO: 0.04 X10(3) UL (ref 0–1)
IMM GRANULOCYTES NFR BLD: 0.6 %
LDLC SERPL CALC-MCNC: 82 MG/DL (ref ?–100)
LYMPHOCYTES # BLD AUTO: 1.8 X10(3) UL (ref 1–4)
LYMPHOCYTES NFR BLD AUTO: 28 %
MCH RBC QN AUTO: 28.7 PG (ref 26–34)
MCHC RBC AUTO-ENTMCNC: 34.8 G/DL (ref 31–37)
MCV RBC AUTO: 82.6 FL
MONOCYTES # BLD AUTO: 0.51 X10(3) UL (ref 0.1–1)
MONOCYTES NFR BLD AUTO: 7.9 %
NEUTROPHILS # BLD AUTO: 3.87 X10 (3) UL (ref 1.5–7.7)
NEUTROPHILS # BLD AUTO: 3.87 X10(3) UL (ref 1.5–7.7)
NEUTROPHILS NFR BLD AUTO: 60.1 %
NONHDLC SERPL-MCNC: 101 MG/DL (ref ?–130)
OSMOLALITY SERPL CALC.SUM OF ELEC: 295 MOSM/KG (ref 275–295)
PLATELET # BLD AUTO: 251 10(3)UL (ref 150–450)
POTASSIUM SERPL-SCNC: 4.1 MMOL/L (ref 3.5–5.1)
PROT SERPL-MCNC: 7.7 G/DL (ref 5.7–8.2)
RBC # BLD AUTO: 5.57 X10(6)UL
SODIUM SERPL-SCNC: 142 MMOL/L (ref 136–145)
TRIGL SERPL-MCNC: 103 MG/DL (ref 30–149)
TSI SER-ACNC: 0.99 MIU/ML (ref 0.55–4.78)
VLDLC SERPL CALC-MCNC: 16 MG/DL (ref 0–30)
WBC # BLD AUTO: 6.4 X10(3) UL (ref 4–11)

## 2023-11-14 PROCEDURE — 36415 COLL VENOUS BLD VENIPUNCTURE: CPT

## 2023-11-14 PROCEDURE — 80053 COMPREHEN METABOLIC PANEL: CPT

## 2023-11-14 PROCEDURE — 85025 COMPLETE CBC W/AUTO DIFF WBC: CPT

## 2023-11-14 PROCEDURE — 84443 ASSAY THYROID STIM HORMONE: CPT

## 2023-11-14 PROCEDURE — 80061 LIPID PANEL: CPT

## 2023-11-16 ENCOUNTER — TELEPHONE (OUTPATIENT)
Dept: INTERNAL MEDICINE CLINIC | Facility: CLINIC | Age: 23
End: 2023-11-16

## 2023-11-16 DIAGNOSIS — R74.01 ELEVATED TRANSAMINASE LEVEL: Primary | ICD-10-CM

## 2023-11-16 NOTE — TELEPHONE ENCOUNTER
Please notify pt that his liver enzymes have been slightly elevated; I would ask that he get an ultrasound of his liver to evaluate this further. I know he has an upcoming appt with Dr. Casandra Singer, so hopefully this can get done before then---and we can discuss next steps.

## 2023-11-17 NOTE — TELEPHONE ENCOUNTER
[facility] SERVICES message sent with DR. Kely Orellana message

## 2023-12-30 NOTE — PATIENT INSTRUCTIONS
- You were seen in clinic for regular annual check-up.  We did review your most recent set of blood work with overall improvement of your liver test, good control of your cholesterol, good control of your sugar levels.    Overall, your doing a great job with targeting weight loss over time.  Even if we are not exercising regularly, optimizing nutrition and opting for more healthy options seems to have promoted weight loss from the last visit.  Keep up the good work  - Incorporate exercise to help build muscle, cardiovascular conditioning as this can help promote weight loss further    We did discuss cutting down on nicotine.  You are off to a good start  - This may be a potential side effect causing sexual dysfunction as we discussed.  Monitor for improvement of the symptoms as you cut down on the nicotine  - Try to cut down by about 10-20% every week until we get 0    Alcohol may be contributing to your increased liver numbers.  This is overall improved likely as result of your weight loss  - The ultrasound did show fatty liver disease that we can reverse over time  - It is reassuring that your cholesterol numbers are normal    We will repeat a nonfasting liver tests in about 3-4 months.  Lets continue incorporating these good healthy habits you have been successful with.  Hopefully the liver numbers normalize by this time.    You likely have a periumbilical hernia.  It is very small, and easily reduces on its own  - No need for surgical repair at this time, monitor for now  - Be sure to lift with your legs and try to minimize excessively heavy lifting which can worsen hernias over time.    -Lets continue trying to target weight loss over time with good nutrition, exercise as able  - Continue managing your anxiety and depression.  Follow with therapy if needed in the future  -We discussed the potential side effects of long-term stimulant use which include high blood pressure, chest pain, palpitations, unintended  weight loss due to poor appetite, insomnia, increased anxiety.  We should do periodic check ins to make sure that it is still safe to continue stimulant medication.  The goal is to control symptoms while minimizing side effects with the lowest effective dose and the shortest duration of time.  - Please continue to eat a varied diet including recommended servings of vegetables, fruits, and low fat dairy. Minimize high saturated fats (such as fast foods) and high sugar intake (such as soda)  - We recommend 150 minutes of moderate intensity exercise (brisk walking, swimming) weekly to maintain your current weight.  Targeted weight loss will require more vigorous exercise or more than 150 minutes/week.    Return to clinic in 1 year for annual physical examination

## 2023-12-30 NOTE — H&P
Cabrera Galloway is a 23 year old male.    HPI:     Chief Complaint   Patient presents with    Physical    Pain     Back and abdominal, possible hernia     Mr. Galloway is a 23 year old male past medical history of ADHD, anxiety and depression, history of pneumomediastinum presents today for annual physical examination    Doing overall well. He has good balance with worklife balance.  Does have hernia in the lower abdominal area.  Was evaluated by general surgery recommended no repair at this time.    Back pain ongoing very manageable. Frequency is a couple times a week. He notices to the area.     He does feel physically dependent on nicotine. He has been drinking more alcohol 8 drinks per week beer/whiskey.    Anxiety and depression is much better. No longer the wellbutrin, Sleep is 6.5-8 hrs.    I did review the patient's past medical history, past surgical history, family history, social history and updated as below    SocHX  - Home: as above  - Work: Mechanical engineering - enjoying job.  - Sexual Activity: inconsistent protection - GF for 2 years  - Hobbies: car things, business on the side   - Nutrition: beef, cutting back on junk food. Bread, lunch meat for lunch. Veggies and fruits on occasion - lettuce, peppers. Drinking a lot water.  - Physical Activity: since last visit - trying to resume exercise. Sedentary.       HISTORY:  Past Medical History:   Diagnosis Date    Abrasion, knee 09/08/2011    ADHD     Anxiety     Depression     Secondary to ADHD    Fracture of humerus, left, closed 2012    healed    Hemorrhoids     Knee effusion, right 09/08/2011    Superficial bruising of lower leg 09/08/2011      Past Surgical History:   Procedure Laterality Date    ELECTROCARDIOGRAM, COMPLETE  11/19/2013 19:22:27    scanned to media tab:  Cabrera Galloway ITZEL 10-; DOS 11-      Family History   Problem Relation Age of Onset    Diabetes Other     Hypertension Father     Cancer Maternal Grandmother          leukemia    Uterine Cancer Maternal Grandmother     Heart Disease Paternal Grandfather     Asthma Neg     Lipids Neg       Social History:   Social History     Socioeconomic History    Marital status: Single   Tobacco Use    Smoking status: Never    Smokeless tobacco: Never   Vaping Use    Vaping Use: Former   Substance and Sexual Activity    Alcohol use: Yes     Alcohol/week: 1.0 standard drink of alcohol     Types: 1 Standard drinks or equivalent per week     Comment: social    Drug use: Not Currently     Types: Cannabis        Medications (Active prior to today's visit):  Current Outpatient Medications   Medication Sig Dispense Refill    Tadalafil (CIALIS) 2.5 MG Oral Tab Take by mouth.         Allergies:  No Known Allergies      ROS:   Positive Findings indicated in BOLD    Constitutional: Fever, Chills, Weight Gain, Weight Loss, Night Sweats, Fatigue, Malaise  ENT/Mouth:  Hearing Changes, Ear Pain, Nasal Congestion, Sinus Pain, Hoarseness, Sore throat, Rhinorrhea, Swallowing Difficulty  Eyes: Eye Pain, Swelling, Redness, Foreign Body, Discharge, Vision Changes  Cardiovascular: Chest Pain, SOB, PND, Dyspnea on Exertion, Orthopnea, Claudication, Edema, Palpitations  Respiratory: Cough, Sputum, Wheezing, Shortness of breath  Gastrointestinal: Nausea, Vomiting, Diarrhea, Constipation, Pain, Heartburn, Dysphagia, Bloody stools, Tarry stools  Genitourinary: Dysmenorrhea, Dysuria, Urinary Frequency, Hematuria, Urinary Incontinence, Urgency,  Flank Pain  Musculoskeletal: Arthralgias, Myalgias, Joint Swelling, Joint Stiffness, Back Pain, Neck Pain  Integumentary: Skin Lesions, Pruritis, Hair Changes, Jaundice, Nail changes  Neuro: Weakness, Numbness, Paresthesias, Loss of Consciousness, Syncope, Dizziness, Headache, Falls  Psych: Anxiety, Depression, Insomnia, Suicidal Ideation, Homicidal ideation, Memory Changes  Heme/Lymph: Bruising, Bleeding, Lymphadenopathy  Endocrine: Polyuria, Polydipsia, Temperature  Intolerance    PHYSICAL EXAM:   Vital Signs:  Blood pressure 124/75, pulse 66, temperature 97.7 °F (36.5 °C), height 6' (1.829 m), weight 249 lb (112.9 kg), SpO2 99%.     Constitutional: No acute distress. Alert and oriented x 3.  Eyes: EOMI, PERRLA, clear sclera b/l  HENT: NCAT, Moist mucous membranes, Oropharynx without erythema or exudates  Neck: No JVD, no thyromegaly  Cardiovascular: S1, S2, no S3, no S4, Regular rate and rhythm, No murmurs/gallops/rubs.   Vascular: Equal pulses 2+  Respiratory: Clear to auscultation bilaterally.  No wheezes/rales/rhonchi  Gastrointestinal: Soft, nontender, nondistended. Positive bowel sounds x 4. No rebound tenderness. No hepatomegaly, No splenomegaly  + Periumbilical left lower quadrant hernia.  Reducible spontaneously  Genitourinary: No CVA tenderness bilaterally  Neurologic: No focal neurological deficits, CN II-XII intact, light touch intact, MSK Strength 5/5 and symmetric in all extremities, normal gait  Musculoskeletal: Full range of motion of all extremities, no clubbing/swelling/edema  Skin: No lesions, No erythema, no jaundice, Cap Refill < 2s  Psychiatric: Appropriate mood and affect  Heme/Lymph/Immune: No cervical/axillary/inguinal LAD      DATA REVIEWED   Labs:    Recent Results (from the past 8760 hour(s))   CBC W/ DIFFERENTIAL    Collection Time: 11/14/23  8:33 AM   Result Value Ref Range    WBC 6.4 4.0 - 11.0 x10(3) uL    RBC 5.57 4.30 - 5.70 x10(6)uL    HGB 16.0 13.0 - 17.5 g/dL    HCT 46.0 39.0 - 53.0 %    MCV 82.6 80.0 - 100.0 fL    MCH 28.7 26.0 - 34.0 pg    MCHC 34.8 31.0 - 37.0 g/dL    RDW-SD 34.4 (L) 35.1 - 46.3 fL    RDW 11.6 11.0 - 15.0 %    .0 150.0 - 450.0 10(3)uL    Neutrophil Absolute Prelim 3.87 1.50 - 7.70 x10 (3) uL    Neutrophil Absolute 3.87 1.50 - 7.70 x10(3) uL    Lymphocyte Absolute 1.80 1.00 - 4.00 x10(3) uL    Monocyte Absolute 0.51 0.10 - 1.00 x10(3) uL    Eosinophil Absolute 0.15 0.00 - 0.70 x10(3) uL    Basophil Absolute 0.07  0.00 - 0.20 x10(3) uL    Immature Granulocyte Absolute 0.04 0.00 - 1.00 x10(3) uL    Neutrophil % 60.1 %    Lymphocyte % 28.0 %    Monocyte % 7.9 %    Eosinophil % 2.3 %    Basophil % 1.1 %    Immature Granulocyte % 0.6 %     *Note: Due to a large number of results and/or encounters for the requested time period, some results have not been displayed. A complete set of results can be found in Results Review.       Recent Results (from the past 8760 hour(s))   Comp Metabolic Panel (14) [E]    Collection Time: 11/14/23  8:33 AM   Result Value Ref Range    Glucose 85 70 - 99 mg/dL    Sodium 142 136 - 145 mmol/L    Potassium 4.1 3.5 - 5.1 mmol/L    Chloride 105 98 - 112 mmol/L    CO2 29.0 21.0 - 32.0 mmol/L    Anion Gap 8 0 - 18 mmol/L    BUN 17 9 - 23 mg/dL    Creatinine 1.12 0.70 - 1.30 mg/dL    BUN/CREA Ratio 15.2 10.0 - 20.0    Calcium, Total 9.9 8.7 - 10.4 mg/dL    Calculated Osmolality 295 275 - 295 mOsm/kg    eGFR-Cr 93 >=60 mL/min/1.73m2    ALT 76 (H) 10 - 49 U/L    AST 30 <=34 U/L    Alkaline Phosphatase 75 45 - 117 U/L    Bilirubin, Total 0.6 0.3 - 1.2 mg/dL    Total Protein 7.7 5.7 - 8.2 g/dL    Albumin 4.6 3.2 - 4.8 g/dL    Globulin  3.1 2.8 - 4.4 g/dL    A/G Ratio 1.5 1.0 - 2.0    Patient Fasting for CMP? Yes      *Note: Due to a large number of results and/or encounters for the requested time period, some results have not been displayed. A complete set of results can be found in Results Review.     Ultrasound liver 1/11/2024  Impression   CONCLUSION:  1. Hepatic steatosis.  No focal hepatic lesions.  2. Normal gallbladder ultrasound.  Normal common duct.  3. No free fluid hepatorenal fossa. No hydronephrosis right kidney.       ASSESSMENT/PLAN:       Periumbilical hernia  Appreciated on examination, but spontaneously reduces  - Will monitor for now, can consider elective repair if enlargement or symptomatic.     Anxiety and Depression  Worsening over the last few months, no SI or HI. Seems to be generalized  but concerned about memory impairment. Fatigue and difficulty with concentration  - Re-established with his therapist  - Continue with coping mechanisms  -Overall doing well, off of Wellbutrin     Elevated LFTs  Most recent ALT 11/14/2023, 76  - Did undergo ultrasound liver shows hepatic steatosis  - Cholesterol seems to be well-controlled  - Would advise starting weight loss over time with good nutrition and exercise  - Try to minimize or avoid alcohol altogether.  - Plan to repeat CMP in 3-6 months    ADHD  Previously on Vyvanse 40 mg, but did not have good benefit.  Was switched to Wellbutrin in October 2021.  -No longer requiring medications at this time.  Overall well-controlled    Pneumomediastinum  Pneumopericardium  Work-up reviewed from last clinic visit.  Esophagram was negative for esophageal perforation.  Only potential contributing factors based on patient's history is height, and prior use of vaping for which she subsequently stopped.  He did recall history of excessive drinking needing to vomiting and hard sneezing.  May be an element of systemic hypertension for which the Vyvanse could be contributing factor?  -Repeat chest x-ray 5/27: Within normal limits.  -Avoid vigorous strenuous activities.  -Patient should stop vaping and any smoking  -Has clinically resolved.     Elevated blood pressure  Noted elevated blood pressure in the clinical setting back in 2019.  His blood pressure was improved prior to starting Vyvanse.  He remains asymptomatic.  Though concerning that he has high blood pressure at such a young age.  Want to secondary work-up.  -Check blood pressure twice a day at various points during the day to evaluate for possible whitecoat hypertension  - Secondary work-up unremarkable  - BP today at goal today     Elevated creatinine  Improved on the inpatient hospitalization review as above  -Repeat kidney function levels with CMP as above, has resolved  -He has been minimizing the use of  caffeine, energy drinks  - This has resolved     Erectile dysfunction  He feels may be psychological - over thinking  - on Cialis which can cause palpitations.  - Will try to cut down on nicotine, alcohol         Orders This Visit:  Orders Placed This Encounter   Procedures    Comp Metabolic Panel (14) [E]       Meds This Visit:  Requested Prescriptions      No prescriptions requested or ordered in this encounter       Imaging & Referrals:  None       Health Maintenance  HTN Screen: BP at goal  DM Screen: A1c at goal  HLD Screen: Check fasting lipid panel at next visit  HCV Screen: Considered low risk  HIV Screen: considered low risk  G/C/Syphilis: Considered low risk    Colon Cancer Screening (50-70): Not indicated  Prostate Cancer Screening: (50-70): Not indicated  Lung Cancer Screening (55-79 with 30 p/year and active < 15 years): Not indicated  AAA Screening (65-75 Hx of smoking): Not indicated    Influenza: Annually   Td/Tdap: Last Tdap assess at next visit  Zoster (60+): Not indicated  HPV (19-26): UTD  Pneumococcal: Not indicated    Immunization History   Administered Date(s) Administered    Covid-19 Vaccine Pfizer 30 mcg/0.3 ml 05/22/2021, 06/22/2021    DTAP 12/07/1998, 02/10/1999, 04/13/1999, 04/26/2000, 10/28/2004    HEP B/HIB 12/07/1998, 02/10/1999, 01/19/2000    Hpv Virus Vaccine 9 Amanda Im 05/31/2019, 08/13/2019, 12/23/2019    IPV 12/07/1998, 02/10/1999, 04/26/2000, 10/28/2004    Influenza 11/28/2007, 11/12/2008, 10/27/2009, 11/20/2012    Influenza Virus Vaccine, H1N1 10/27/2009    MMR 10/20/1999, 10/28/2004    Meningococcal-Menactra 08/07/2014    Meningococcal-Menveo 10-55 YEARS 05/31/2019    Meningococcal-Menveo 2month-55yr 05/31/2019    Rotavirus 3 Dose 12/07/1998, 01/04/1999, 02/10/1999    TD 03/12/2021    TDAP 05/18/2010    Varicella 01/19/2000, 05/18/2010     Return to clinic in 12 months for checkup      Teo Castano MD, 01/11/24, 9:51 PM

## 2024-01-03 ENCOUNTER — HOSPITAL ENCOUNTER (OUTPATIENT)
Dept: ULTRASOUND IMAGING | Age: 24
Discharge: HOME | End: 2024-01-03
Attending: INTERNAL MEDICINE
Payer: COMMERCIAL

## 2024-01-03 DIAGNOSIS — R74.01 ELEVATED TRANSAMINASE LEVEL: ICD-10-CM

## 2024-01-11 ENCOUNTER — HOSPITAL ENCOUNTER (OUTPATIENT)
Dept: ULTRASOUND IMAGING | Age: 24
Discharge: HOME | End: 2024-01-11
Attending: INTERNAL MEDICINE
Payer: COMMERCIAL

## 2024-01-11 ENCOUNTER — OFFICE VISIT (OUTPATIENT)
Dept: INTERNAL MEDICINE CLINIC | Facility: CLINIC | Age: 24
End: 2024-01-11

## 2024-01-11 VITALS
DIASTOLIC BLOOD PRESSURE: 75 MMHG | TEMPERATURE: 98 F | BODY MASS INDEX: 33.72 KG/M2 | HEIGHT: 72 IN | SYSTOLIC BLOOD PRESSURE: 124 MMHG | WEIGHT: 249 LBS | HEART RATE: 66 BPM | OXYGEN SATURATION: 99 %

## 2024-01-11 DIAGNOSIS — Z13.29 SCREENING FOR THYROID DISORDER: ICD-10-CM

## 2024-01-11 DIAGNOSIS — F41.9 ANXIETY: ICD-10-CM

## 2024-01-11 DIAGNOSIS — Z00.00 ANNUAL PHYSICAL EXAM: Primary | ICD-10-CM

## 2024-01-11 DIAGNOSIS — R79.89 ELEVATED LFTS: ICD-10-CM

## 2024-01-11 DIAGNOSIS — Z13.0 SCREENING FOR DEFICIENCY ANEMIA: ICD-10-CM

## 2024-01-11 DIAGNOSIS — Z13.220 SCREENING FOR LIPOID DISORDERS: ICD-10-CM

## 2024-01-11 DIAGNOSIS — Z13.1 SCREENING FOR DIABETES MELLITUS: ICD-10-CM

## 2024-01-11 DIAGNOSIS — F90.0 ATTENTION DEFICIT HYPERACTIVITY DISORDER (ADHD), PREDOMINANTLY INATTENTIVE TYPE: ICD-10-CM

## 2024-01-11 PROCEDURE — 3078F DIAST BP <80 MM HG: CPT | Performed by: INTERNAL MEDICINE

## 2024-01-11 PROCEDURE — 3074F SYST BP LT 130 MM HG: CPT | Performed by: INTERNAL MEDICINE

## 2024-01-11 PROCEDURE — 99395 PREV VISIT EST AGE 18-39: CPT | Performed by: INTERNAL MEDICINE

## 2024-01-11 PROCEDURE — 76705 ECHO EXAM OF ABDOMEN: CPT | Performed by: INTERNAL MEDICINE

## 2024-01-11 PROCEDURE — 3008F BODY MASS INDEX DOCD: CPT | Performed by: INTERNAL MEDICINE

## 2024-01-11 RX ORDER — TADALAFIL 2.5 MG/1
TABLET ORAL
COMMUNITY

## 2024-06-14 ENCOUNTER — HOSPITAL ENCOUNTER (OUTPATIENT)
Age: 24
Discharge: HOME | End: 2024-06-14
Attending: EMERGENCY MEDICINE

## 2024-06-14 VITALS
SYSTOLIC BLOOD PRESSURE: 140 MMHG | OXYGEN SATURATION: 100 % | DIASTOLIC BLOOD PRESSURE: 74 MMHG | HEART RATE: 91 BPM | TEMPERATURE: 99 F | RESPIRATION RATE: 16 BRPM

## 2024-06-14 DIAGNOSIS — R50.9 FEVER OF UNKNOWN ORIGIN: Primary | ICD-10-CM

## 2024-06-14 LAB
#MXD IC: 0.7 X10ˆ3/UL (ref 0.1–1)
BUN BLD-MCNC: 14 MG/DL (ref 7–18)
CHLORIDE BLD-SCNC: 101 MMOL/L (ref 98–112)
CO2 BLD-SCNC: 28 MMOL/L (ref 21–32)
CREAT BLD-MCNC: 1.2 MG/DL
EGFRCR SERPLBLD CKD-EPI 2021: 86 ML/MIN/1.73M2 (ref 60–?)
GLUCOSE BLD-MCNC: 115 MG/DL (ref 70–99)
HCT VFR BLD AUTO: 41.2 %
HCT VFR BLD CALC: 43 %
HGB BLD-MCNC: 14.5 G/DL
ISTAT IONIZED CALCIUM FOR CHEM 8: 1.2 MMOL/L (ref 1.12–1.32)
LYMPHOCYTES # BLD AUTO: 1.3 X10ˆ3/UL (ref 1–4)
LYMPHOCYTES NFR BLD AUTO: 21.2 %
MCH RBC QN AUTO: 28.4 PG (ref 26–34)
MCHC RBC AUTO-ENTMCNC: 35.2 G/DL (ref 31–37)
MCV RBC AUTO: 80.8 FL (ref 80–100)
MIXED CELL %: 11.1 %
NEUTROPHILS # BLD AUTO: 4.2 X10ˆ3/UL (ref 1.5–7.7)
NEUTROPHILS NFR BLD AUTO: 67.7 %
PLATELET # BLD AUTO: 140 X10ˆ3/UL (ref 150–450)
POCT INFLUENZA A: NEGATIVE
POCT INFLUENZA B: NEGATIVE
POTASSIUM BLD-SCNC: 3.9 MMOL/L (ref 3.6–5.1)
RBC # BLD AUTO: 5.1 X10ˆ6/UL
SARS-COV-2 RNA RESP QL NAA+PROBE: NOT DETECTED
SODIUM BLD-SCNC: 137 MMOL/L (ref 136–145)
WBC # BLD AUTO: 6.2 X10ˆ3/UL (ref 4–11)

## 2024-06-14 PROCEDURE — 85025 COMPLETE CBC W/AUTO DIFF WBC: CPT | Performed by: EMERGENCY MEDICINE

## 2024-06-14 PROCEDURE — 36415 COLL VENOUS BLD VENIPUNCTURE: CPT

## 2024-06-14 PROCEDURE — 86618 LYME DISEASE ANTIBODY: CPT | Performed by: EMERGENCY MEDICINE

## 2024-06-14 PROCEDURE — 99213 OFFICE O/P EST LOW 20 MIN: CPT

## 2024-06-14 PROCEDURE — 99204 OFFICE O/P NEW MOD 45 MIN: CPT

## 2024-06-14 PROCEDURE — 87502 INFLUENZA DNA AMP PROBE: CPT | Performed by: EMERGENCY MEDICINE

## 2024-06-14 PROCEDURE — 80047 BASIC METABLC PNL IONIZED CA: CPT

## 2024-06-14 RX ORDER — DOXYCYCLINE HYCLATE 100 MG/1
100 CAPSULE ORAL 2 TIMES DAILY
Qty: 20 CAPSULE | Refills: 0 | Status: SHIPPED | OUTPATIENT
Start: 2024-06-14 | End: 2024-06-24

## 2024-06-14 NOTE — ED INITIAL ASSESSMENT (HPI)
Pt here with intermittent fevers since Tuesday, reports 2 weeks ago had 2 ticks on him. Denies was bitten.

## 2024-06-14 NOTE — ED PROVIDER NOTES
Patient Seen in: [facility]      History     Chief Complaint   Patient presents with    Fever     Stated Complaint: possible high fever    Subjective:   HPI    The patient is a 23-year-old male with no significant past medical history who presents now with fever, joint pain.  The patient states he began experience fever as high as 102 for the last 2 days.  Patient has had mild nasal congestion.  The patient denies any sore throat, cough, urinary complaints.  Patient states he has had diffuse joint pain.  Patient states that he was in [place] a few weeks ago and found multiple ticks on his body.  He states he never removed an engorged tick but did have multiple small ticks on his body which he removed.  Patient denies any rash.  Patient is concerned about possible Lyme disease    Objective:   Past Medical History:    Abrasion, knee    ADHD    Anxiety    Depression    Secondary to ADHD    Fracture of humerus, left, closed    healed    Hemorrhoids    Knee effusion, right    Superficial bruising of lower leg              Past Surgical History:   Procedure Laterality Date    Electrocardiogram, complete  11/19/2013 19:22:27    scanned to media tab:  Cabrera Galloway 10-; VIRIDIANA 11-                Social History     Socioeconomic History    Marital status: Single   Tobacco Use    Smoking status: Never    Smokeless tobacco: Never   Vaping Use    Vaping status: Former   Substance and Sexual Activity    Alcohol use: Yes     Alcohol/week: 1.0 standard drink of alcohol     Types: 1 Standard drinks or equivalent per week     Comment: social    Drug use: Not Currently     Types: Cannabis     Social Determinants of Health      Received from [facility], [facility]    Financial Resource Strain    [facility]    [facility] Needs    [email or web address]    Social Connections              Review of Systems    Positive for  stated complaint: possible high fever  Other systems are as noted in HPI.  Constitutional and vital signs reviewed.      All other systems reviewed and negative except as noted above.    Physical Exam     ED Triage Vitals [06/14/24 0926]   /74   Pulse 91   Resp 16   Temp 98.5 °F (36.9 °C)   Temp src Oral   SpO2 100 %   O2 Device None (Room air)       Current Vitals:   Vital Signs  BP: 140/74  Pulse: 91  Resp: 16  Temp: 98.5 °F (36.9 °C)  Temp src: Oral    Oxygen Therapy  SpO2: 100 %  O2 Device: None (Room air)            Physical Exam    Constitutional: Well-developed well-nourished in no acute distress  Head: Normocephalic, no swelling or tenderness  Eyes: Nonicteric sclera, no conjunctival injection  ENT: TMs are clear and flat bilaterally.  There is no posterior pharyngeal erythema  Chest: Clear to auscultation, no tenderness  Cardiovascular: Regular rate and rhythm without murmur  Abdomen: Soft, nontender and nondistended  Neurologic: Patient is awake, alert and oriented ×3.  The patient's motor strength is 5 out of 5 and symmetric in the upper and lower extremities bilaterally  Extremities: No focal swelling or tenderness  Skin: No pallor, no redness or warmth to the touch      ED Course     Labs Reviewed   POCT CBC - Abnormal; Notable for the following components:       Result Value    PLT .0 (*)     All other components within normal limits   POCT ISTAT CHEM8 CARTRIDGE - Abnormal; Notable for the following components:    ISTAT Glucose 115 (*)     All other components within normal limits   RAPID SARS-COV-2 BY PCR - Normal   POCT FLU TEST - Normal    Narrative:     This assay is a rapid molecular in vitro test utilizing nucleic acid amplification of influenza A and B viral RNA.   LYME DISEASE, TOTAL AB W RFLX             Patient's negative COVID, negative flu, norm is unremarkable.  The patient verbalizes an understandingal white blood cell count reviewed with the patient.  Patient is concerned  about the possibility of Lyme's disease.  Titers were sent.  The risk and benefits of initiating treatment for possible Lyme's were discussed.  The patient prefers treatment.  Recommend stopping antibiotics if the Lyme lab work         MDM      adult fever including viral syndrome, influenza, urinary tract infection, pneumonia                                     Medical Decision Making      Disposition and Plan     Clinical Impression:  1. Fever of unknown origin         Disposition:  Discharge  6/14/2024 10:05 am    Follow-up:  Laverne Bolivar DO  [address] [place] [id number]  [phone]      As needed          Medications Prescribed:  Discharge Medication List as of 6/14/2024 10:07 AM        START taking these medications    Details   doxycycline 100 MG Oral Cap Take 1 capsule (100 mg total) by mouth 2 (two) times daily for 10 days., Normal, Disp-20 capsule, R-0

## 2024-06-19 LAB — B BURGDOR IGG+IGM SER QL: NEGATIVE

## 2024-07-01 ENCOUNTER — MED REC SCAN ONLY (OUTPATIENT)
Dept: INTERNAL MEDICINE CLINIC | Facility: CLINIC | Age: 24
End: 2024-07-01

## 2024-12-17 ENCOUNTER — TELEPHONE (OUTPATIENT)
Dept: INTERNAL MEDICINE CLINIC | Facility: CLINIC | Age: 24
End: 2024-12-17

## 2024-12-17 NOTE — TELEPHONE ENCOUNTER
As FYI to  - called patient who states for 2 weeks he is having chest pain ,Shortness of breath. When walking stairs , light headed - got worse since it started  RN instructed patient that he needs to be evaluated in ER - patient  agreed F/U 12/18

## 2024-12-18 NOTE — TELEPHONE ENCOUNTER
Called patient who did not go to ER - RN advised to go to UC due to Shortness of breath. , palpitations lightheaded -patient agrees and will [facility]  F/U 12/19

## 2024-12-27 NOTE — TELEPHONE ENCOUNTER
To Dr Bolivar to please review----Patient was advised to go to ER on 12/17, was called on 12/18 and stated he did not go to ER but stated he would go to urgent care  On 12/20 we reached out to patient again but have not heard back

## 2025-04-10 ENCOUNTER — TELEPHONE (OUTPATIENT)
Dept: INTERNAL MEDICINE CLINIC | Facility: CLINIC | Age: 25
End: 2025-04-10

## 2025-04-10 DIAGNOSIS — Z00.00 ANNUAL PHYSICAL EXAM: Primary | ICD-10-CM

## 2025-04-17 ENCOUNTER — LAB ENCOUNTER (OUTPATIENT)
Dept: LAB | Age: 25
End: 2025-04-17
Attending: INTERNAL MEDICINE
Payer: COMMERCIAL

## 2025-04-17 DIAGNOSIS — Z00.00 ANNUAL PHYSICAL EXAM: ICD-10-CM

## 2025-04-17 LAB
ALBUMIN SERPL-MCNC: 4.7 G/DL (ref 3.2–4.8)
ALBUMIN/GLOB SERPL: 1.7 {RATIO} (ref 1–2)
ALP LIVER SERPL-CCNC: 64 U/L (ref 45–117)
ALT SERPL-CCNC: 65 U/L (ref 10–49)
ANION GAP SERPL CALC-SCNC: 8 MMOL/L (ref 0–18)
AST SERPL-CCNC: 33 U/L (ref ?–34)
BASOPHILS # BLD AUTO: 0.06 X10(3) UL (ref 0–0.2)
BASOPHILS NFR BLD AUTO: 0.6 %
BILIRUB SERPL-MCNC: 0.6 MG/DL (ref 0.3–1.2)
BUN BLD-MCNC: 20 MG/DL (ref 9–23)
BUN/CREAT SERPL: 16.4 (ref 10–20)
CALCIUM BLD-MCNC: 9.2 MG/DL (ref 8.7–10.4)
CHLORIDE SERPL-SCNC: 103 MMOL/L (ref 98–112)
CHOLEST SERPL-MCNC: 156 MG/DL (ref ?–200)
CO2 SERPL-SCNC: 27 MMOL/L (ref 21–32)
CREAT BLD-MCNC: 1.22 MG/DL (ref 0.7–1.3)
DEPRECATED RDW RBC AUTO: 33.8 FL (ref 35.1–46.3)
EGFRCR SERPLBLD CKD-EPI 2021: 84 ML/MIN/1.73M2 (ref 60–?)
EOSINOPHIL # BLD AUTO: 0.2 X10(3) UL (ref 0–0.7)
EOSINOPHIL NFR BLD AUTO: 1.9 %
ERYTHROCYTE [DISTWIDTH] IN BLOOD BY AUTOMATED COUNT: 11.5 % (ref 11–15)
FASTING PATIENT LIPID ANSWER: YES
FASTING STATUS PATIENT QL REPORTED: YES
GLOBULIN PLAS-MCNC: 2.8 G/DL (ref 2–3.5)
GLUCOSE BLD-MCNC: 95 MG/DL (ref 70–99)
HCT VFR BLD AUTO: 41.5 % (ref 39–53)
HDLC SERPL-MCNC: 54 MG/DL (ref 40–59)
HGB BLD-MCNC: 14.8 G/DL (ref 13–17.5)
IMM GRANULOCYTES # BLD AUTO: 0.06 X10(3) UL (ref 0–1)
IMM GRANULOCYTES NFR BLD: 0.6 %
LDLC SERPL CALC-MCNC: 91 MG/DL (ref ?–100)
LYMPHOCYTES # BLD AUTO: 1.47 X10(3) UL (ref 1–4)
LYMPHOCYTES NFR BLD AUTO: 13.7 %
MCH RBC QN AUTO: 29.2 PG (ref 26–34)
MCHC RBC AUTO-ENTMCNC: 35.7 G/DL (ref 31–37)
MCV RBC AUTO: 82 FL (ref 80–100)
MONOCYTES # BLD AUTO: 0.75 X10(3) UL (ref 0.1–1)
MONOCYTES NFR BLD AUTO: 7 %
NEUTROPHILS # BLD AUTO: 8.2 X10 (3) UL (ref 1.5–7.7)
NEUTROPHILS # BLD AUTO: 8.2 X10(3) UL (ref 1.5–7.7)
NEUTROPHILS NFR BLD AUTO: 76.2 %
NONHDLC SERPL-MCNC: 102 MG/DL (ref ?–130)
OSMOLALITY SERPL CALC.SUM OF ELEC: 288 MOSM/KG (ref 275–295)
PLATELET # BLD AUTO: 206 10(3)UL (ref 150–450)
POTASSIUM SERPL-SCNC: 3.9 MMOL/L (ref 3.5–5.1)
PROT SERPL-MCNC: 7.5 G/DL (ref 5.7–8.2)
RBC # BLD AUTO: 5.06 X10(6)UL (ref 4.3–5.7)
SODIUM SERPL-SCNC: 138 MMOL/L (ref 136–145)
TRIGL SERPL-MCNC: 51 MG/DL (ref 30–149)
TSI SER-ACNC: 1.89 UIU/ML (ref 0.55–4.78)
VLDLC SERPL CALC-MCNC: 8 MG/DL (ref 0–30)
WBC # BLD AUTO: 10.7 X10(3) UL (ref 4–11)

## 2025-04-17 PROCEDURE — 85025 COMPLETE CBC W/AUTO DIFF WBC: CPT

## 2025-04-17 PROCEDURE — 80053 COMPREHEN METABOLIC PANEL: CPT

## 2025-04-17 PROCEDURE — 80061 LIPID PANEL: CPT

## 2025-04-17 PROCEDURE — 36415 COLL VENOUS BLD VENIPUNCTURE: CPT

## 2025-04-17 PROCEDURE — 84443 ASSAY THYROID STIM HORMONE: CPT

## 2025-04-20 NOTE — H&P
Cabrera Galloway is a 24 year old male.    HPI:     Chief Complaint   Patient presents with    Physical     Pt here for annual physical exam , pt also c/ongoing neck aond back pain x 1 year and muscular chest pain x 4 months.      Mr. Galloway is a 24 year old male past medical history of ADHD, anxiety and depression, history of pneumomediastinum presents today for annual physical examination    Not bad today. 1 year ago, no inciting event. He has had injuries in the back. Ibuprofen helps. They're together, upper back deepache and the neck inflammation. Not able to crack his neck. [employer] does not allow him to crack his neck as much. Comes and goes, some days excruciating at his desk. 5 days out of the month.     Chest pain 1 month ago - tense and bubbly. Started with anxiety but lasted for 2 months. Feels more muscular, has gotten better.     Alcohol socially - both hard liquid,     Work-life balance is beter controlled.  No problems with ADHD, depression anxiety at this time.    I did review the patient's past medical history, past surgical history, family history, social history and updated as below    SocHX  - Home: as above  - Work: Mechanical engineering - enjoying job.  - Sexual Activity: inconsistent protection - GF for 2 years  - Hobbies: car things, business on the side   - Nutrition:    Eggs and toast for breakfast  Eggs and   More beef for lunch, not as much fruits veggies  Water intake, not as much pop    - Physical Activity: Sedentary.       HISTORY:  Past Medical History:    Abrasion, knee    ADHD    Anxiety    Depression    Secondary to ADHD    Fracture of humerus, left, closed    healed    Hemorrhoids    Knee effusion, right    Superficial bruising of lower leg      Past Surgical History:   Procedure Laterality Date    Electrocardiogram, complete  11/19/2013 19:22:27    scanned to media tab:  Cabrera Galloway 10-; DOS 11-      Family History   Problem Relation Age of Onset    Diabetes  Other     Hypertension Father     Cancer Maternal Grandmother         leukemia    Uterine Cancer Maternal Grandmother     Heart Disease Paternal Grandfather     Asthma Neg     Lipids Neg       Social History:   Social History     Socioeconomic History    Marital status: Single   Tobacco Use    Smoking status: Never    Smokeless tobacco: Never   Vaping Use    Vaping status: Former   Substance and Sexual Activity    Alcohol use: Yes     Alcohol/week: 1.0 standard drink of alcohol     Types: 1 Standard drinks or equivalent per week     Comment: social    Drug use: Not Currently     Types: Cannabis     Social Drivers of Health      Received from [facility]    Transportation Needs        Medications (Active prior to today's visit):  Current Outpatient Medications   Medication Sig Dispense Refill    cyclobenzaprine 10 MG Oral Tab Take 1 tablet (10 mg total) by mouth 3 (three) times daily for 20 days. 30 tablet 1    Omeprazole 40 MG Oral Capsule Delayed Release Take 1 capsule (40 mg total) by mouth daily. 90 capsule 0       Allergies:  No Known Allergies      ROS:   Positive Findings indicated in BOLD    Constitutional: Fever, Chills, Weight Gain, Weight Loss, Night Sweats, Fatigue, Malaise  ENT/Mouth:  Hearing Changes, Ear Pain, Nasal Congestion, Sinus Pain, Hoarseness, Sore throat, Rhinorrhea, Swallowing Difficulty  Eyes: Eye Pain, Swelling, Redness, Foreign Body, Discharge, Vision Changes  Cardiovascular: Chest Pain, SOB, PND, Dyspnea on Exertion, Orthopnea, Claudication, Edema, Palpitations  Respiratory: Cough, Sputum, Wheezing, Shortness of breath  Gastrointestinal: Nausea, Vomiting, Diarrhea, Constipation, Pain, Heartburn, Dysphagia, Bloody stools, Tarry stools  Genitourinary: Dysmenorrhea, Dysuria, Urinary Frequency, Hematuria, Urinary Incontinence, Urgency,  Flank Pain  Musculoskeletal: Arthralgias, Myalgias, Joint Swelling, Joint Stiffness, Back Pain, Neck Pain  Integumentary: Skin Lesions, Pruritis, Hair  Changes, Jaundice, Nail changes  Neuro: Weakness, Numbness, Paresthesias, Loss of Consciousness, Syncope, Dizziness, Headache, Falls  Psych: Anxiety, Depression, Insomnia, Suicidal Ideation, Homicidal ideation, Memory Changes  Heme/Lymph: Bruising, Bleeding, Lymphadenopathy  Endocrine: Polyuria, Polydipsia, Temperature Intolerance    PHYSICAL EXAM:   Vital Signs:  Blood pressure 122/82, pulse 99, temperature 97.9 °F (36.6 °C), height 6' (1.829 m), weight 258 lb (117 kg), SpO2 98%.     Constitutional: No acute distress. Alert and oriented x 3.  Eyes: EOMI, PERRLA, clear sclera b/l  HENT: NCAT, Moist mucous membranes, Oropharynx without erythema or exudates  Neck: No JVD, no thyromegaly  Cardiovascular: S1, S2, no S3, no S4, Regular rate and rhythm, No murmurs/gallops/rubs.   Vascular: Equal pulses 2+  Respiratory: Clear to auscultation bilaterally.  No wheezes/rales/rhonchi  Gastrointestinal: Soft, nontender, nondistended. Positive bowel sounds x 4. No rebound tenderness. No hepatomegaly, No splenomegaly; + very small subcutaneous cyst in the left lower quadrant,  Genitourinary: No CVA tenderness bilaterally  Neurologic: No focal neurological deficits, CN II-XII intact, light touch intact, MSK Strength 5/5 and symmetric in all extremities, normal gait  Musculoskeletal: Full range of motion of all extremities, no clubbing/swelling/edema  Skin: No lesions, No erythema, no jaundice, Cap Refill < 2s  Psychiatric: Appropriate mood and affect  Heme/Lymph/Immune: No cervical/axillary/inguinal LAD      DATA REVIEWED   Labs:    Recent Results (from the past 8760 hours)   CBC W Differential W Platelet [E]    Collection Time: 04/17/25  9:31 AM   Result Value Ref Range    WBC 10.7 4.0 - 11.0 x10(3) uL    RBC 5.06 4.30 - 5.70 x10(6)uL    HGB 14.8 13.0 - 17.5 g/dL    HCT 41.5 39.0 - 53.0 %    MCV 82.0 80.0 - 100.0 fL    MCH 29.2 26.0 - 34.0 pg    MCHC 35.7 31.0 - 37.0 g/dL    RDW-SD 33.8 (L) 35.1 - 46.3 fL    RDW 11.5 11.0 - 15.0  %    .0 150.0 - 450.0 10(3)uL    Neutrophil Absolute Prelim 8.20 (H) 1.50 - 7.70 x10 (3) uL    Neutrophil Absolute 8.20 (H) 1.50 - 7.70 x10(3) uL    Lymphocyte Absolute 1.47 1.00 - 4.00 x10(3) uL    Monocyte Absolute 0.75 0.10 - 1.00 x10(3) uL    Eosinophil Absolute 0.20 0.00 - 0.70 x10(3) uL    Basophil Absolute 0.06 0.00 - 0.20 x10(3) uL    Immature Granulocyte Absolute 0.06 0.00 - 1.00 x10(3) uL    Neutrophil % 76.2 %    Lymphocyte % 13.7 %    Monocyte % 7.0 %    Eosinophil % 1.9 %    Basophil % 0.6 %    Immature Granulocyte % 0.6 %     *Note: Due to a large number of results and/or encounters for the requested time period, some results have not been displayed. A complete set of results can be found in Results Review.       Recent Results (from the past 8760 hours)   Comp Metabolic Panel (14) [E]    Collection Time: 04/17/25  9:31 AM   Result Value Ref Range    Glucose 95 70 - 99 mg/dL    Sodium 138 136 - 145 mmol/L    Potassium 3.9 3.5 - 5.1 mmol/L    Chloride 103 98 - 112 mmol/L    CO2 27.0 21.0 - 32.0 mmol/L    Anion Gap 8 0 - 18 mmol/L    BUN 20 9 - 23 mg/dL    Creatinine 1.22 0.70 - 1.30 mg/dL    BUN/CREA Ratio 16.4 10.0 - 20.0    Calcium, Total 9.2 8.7 - 10.4 mg/dL    Calculated Osmolality 288 275 - 295 mOsm/kg    eGFR-Cr 84 >=60 mL/min/1.73m2     Comment: eGRF calculated using the CKD-EPI 2021 calculation    ALT 65 (H) 10 - 49 U/L    AST 33 <34 U/L    Alkaline Phosphatase 64 45 - 117 U/L    Bilirubin, Total 0.6 0.3 - 1.2 mg/dL    Total Protein 7.5 5.7 - 8.2 g/dL    Albumin 4.7 3.2 - 4.8 g/dL    Globulin  2.8 2.0 - 3.5 g/dL    A/G Ratio 1.7 1.0 - 2.0    Patient Fasting for CMP? Yes      *Note: Due to a large number of results and/or encounters for the requested time period, some results have not been displayed. A complete set of results can be found in Results Review.     Ultrasound liver 1/11/2024  Impression   CONCLUSION:  1. Hepatic steatosis.  No focal hepatic lesions.  2. Normal gallbladder  ultrasound.  Normal common duct.  3. No free fluid hepatorenal fossa. No hydronephrosis right kidney.       ASSESSMENT/PLAN:     Atypical Chest pain  Separte from the neck and back  - Will hold off on EKG unless symptoms persist.  - Given Hx of pneumothorax, will check CXR only if symptoms persist.  - Suspect GERD component if initial tests unremarkable. Will trial omeprazole 40 mg once a day for 2 weeks even if symptoms improve  - Tried to avoid the food triggers    Neck and mid back pain  I suspect musculoskeletal strain, muscle spasm.  -Would recommend initial trial of conservative therapy:    -Acetaminophen 500-650 mg every 4-6 hours as needed for pain relief  -Ibuprofen 200-400 mg every 8 hours as needed for anti-inflammatory and pain relief  -For more severe pain, will try cyclobenzaprine 5 mg.  Take first at nighttime as this can cause sleepiness, drowsiness.  If tolerated well, can use 3 times a day on an as-needed basis.  Be careful with driving or operating heavy machinery on this medication  -Trial of physical therapy    -Advised to notify clinic if still no improvement in 4 to 6 weeks.      Periumbilical hernia  Appreciated on examination, but spontaneously reduces  - Much smaller than prior assessment, questionable if this was a hernia as he did see general surgery but felt this was not a hernia  - Possibly small subcutaneous cyst, lipoma     Anxiety and Depression  Worsening over the last few months, no SI or HI. Seems to be generalized but concerned about memory impairment. Fatigue and difficulty with concentration  - Re-established with his therapist  - Continue with coping mechanisms  -Overall doing well, off of Wellbutrin     Elevated LFTs  Most recent ALT 65  - Did undergo ultrasound liver shows hepatic steatosis  - Cholesterol seems to be well-controlled  - Would advise starting weight loss over time with good nutrition and exercise  - Try to minimize or avoid alcohol altogether.  - Plan to  repeat CMP prior to the next visit    ADHD  Previously on Vyvanse 40 mg, but did not have good benefit.  Was switched to Wellbutrin in October 2021.  -No longer requiring medications at this time.  Overall well-controlled    Pneumomediastinum  Pneumopericardium  Work-up reviewed from last clinic visit.  Esophagram was negative for esophageal perforation.  Only potential contributing factors based on patient's history is height, and prior use of vaping for which she subsequently stopped.  He did recall history of excessive drinking needing to vomiting and hard sneezing.  May be an element of systemic hypertension for which the Vyvanse could be contributing factor?  -Repeat chest x-ray 5/27: Within normal limits.  -Avoid vigorous strenuous activities.  -Patient should stop vaping and any smoking  -Has clinically resolved.     Elevated blood pressure  Noted elevated blood pressure in the clinical setting back in 2019.  His blood pressure was improved prior to starting Vyvanse.  He remains asymptomatic.  Though concerning that he has high blood pressure at such a young age.  Want to secondary work-up.  -Check blood pressure twice a day at various points during the day to evaluate for possible whitecoat hypertension  - Secondary work-up unremarkable  - [facility] today [facility].  May be some degree of whitecoat hypertension MD Omaira, 04/24/25, 9:49 AM          Erectile dysfunction  He feels may be psychological - over thinking  - on Cialis which can cause palpitations.  - Will try to cut down on nicotine, alcohol         Orders This Visit:  Orders Placed This Encounter   Procedures    Comp Metabolic Panel (14)    C-Reactive Protein [E]    CBC W Differential W Platelet [E]       Meds This Visit:  Requested Prescriptions     Signed Prescriptions Disp Refills    cyclobenzaprine 10 MG Oral Tab 30 tablet 1     Sig: Take 1 tablet (10 mg total) by mouth 3 (three) times daily for 20 days.    Omeprazole 40 MG Oral Capsule Delayed  Release 90 capsule 0     Sig: Take 1 capsule (40 mg total) by mouth daily.       Imaging & Referrals:  XR CHEST PA + LAT CHEST (CPT=71046)  XR CERVICAL SPINE (2-3 VIEWS) (CPT=72040)  XR THORACIC SPINE (2 VIEWS) (CPT=72070)       Health Maintenance  HTN Screen: BP at goal  DM Screen: As above  HLD Screen: As above  HCV Screen: Considered low risk  HIV Screen: considered low risk  G/C/Syphilis: Considered low risk    Colon Cancer Screening (50-70): Not indicated  Prostate Cancer Screening: (50-70): Not indicated  Lung Cancer Screening (55-79 with 30 p/year and active < 15 years): Not indicated  AAA Screening (65-75 Hx of smoking): Not indicated    Influenza: Annually   Td/Tdap: Last Tdap assess at next visit  Zoster (60+): Not indicated  HPV (19-26): UTD  Pneumococcal: Not indicated    Immunization History   Administered Date(s) Administered    Covid-19 Vaccine Pfizer 30 mcg/0.3 ml 05/22/2021, 06/22/2021    DTAP 12/07/1998, 02/10/1999, 04/13/1999, 04/26/2000, 10/28/2004    HEP B/HIB 12/07/1998, 02/10/1999, 01/19/2000    Hpv Virus Vaccine 9 [facility] Im 05/31/2019, 08/13/2019, 12/23/2019    IPV 12/07/1998, 02/10/1999, 04/26/2000, 10/28/2004    Influenza 11/28/2007, 11/12/2008, 10/27/2009, 11/20/2012    Influenza Virus Vaccine, H1N1 10/27/2009    MMR 10/20/1999, 10/28/2004    Meningococcal-Menactra 08/07/2014    Meningococcal-Menveo 10-55 YEARS 05/31/2019    Meningococcal-Menveo 2month-55yr 05/31/2019    Rotavirus 3 Dose 12/07/1998, 01/04/1999, 02/10/1999    TD 03/12/2021    TDAP 05/18/2010    Varicella 01/19/2000, 05/18/2010     Return to clinic i6 n 12 months for checkup    Teo Castano MD, 04/24/25, 9:49 AM

## 2025-04-20 NOTE — PATIENT INSTRUCTIONS
- You were seen in clinic for regular annual check-up.  We did review your most recent set of blood work with overall improvement of your liver test, good control of your cholesterol, good control of your sugar levels.    No need to check EKG, x-ray of the chest area.  Unless the symptoms persist or if you develop shortness of breath  - I suspect this is acid reflux. Will trial omeprazole 40 mg once a day for 2 weeks even if symptoms improve  - Tried to avoid the food triggers    -Would recommend initial trial of conservative therapy:    -Acetaminophen 500-650 mg every 4-6 hours as needed for pain relief  -Ibuprofen 200-400 mg every 8 hours as needed for anti-inflammatory and pain relief  -For more severe pain, will try cyclobenzaprine 5 mg.  Take first at nighttime as this can cause sleepiness, drowsiness.  If tolerated well, can use 3 times a day on an as-needed basis.  Be careful with driving or operating heavy machinery on this medication  -Trial of physical therapy    -Advised to notify clinic if still no improvement in 4 to 6 weeks.    Alcohol may be contributing to your increased liver numbers.  This is overall improved likely as result of your weight loss  - The ultrasound did show fatty liver disease that we can reverse over time  - It is reassuring that your cholesterol numbers are normal    We will repeat a nonfasting liver tests in about 3-4 months.  Lets continue incorporating these good healthy habits you have been successful with.  Hopefully the liver numbers normalize by this time.    You likely have a periumbilical hernia.  It is very small, and easily reduces on its own  - No need for surgical repair at this time, monitor for now  - Be sure to lift with your legs and try to minimize excessively heavy lifting which can worsen hernias over time.    -Lets continue trying to target weight loss over time with good nutrition, exercise as able  - Continue managing your anxiety and depression.  Follow with  therapy if needed in the future  -We discussed the potential side effects of long-term stimulant use which include high blood pressure, chest pain, palpitations, unintended weight loss due to poor appetite, insomnia, increased anxiety.  We should do periodic check ins to make sure that it is still safe to continue stimulant medication.  The goal is to control symptoms while minimizing side effects with the lowest effective dose and the shortest duration of time.  - Please continue to eat a varied diet including recommended servings of vegetables, fruits, and low fat dairy. Minimize high saturated fats (such as fast foods) and high sugar intake (such as soda)  - We recommend 150 minutes of moderate intensity exercise (brisk walking, swimming) weekly to maintain your current weight.  Targeted weight loss will require more vigorous exercise or more than 150 minutes/week.    Return to clinic in 6 months for follow-up

## 2025-04-24 ENCOUNTER — OFFICE VISIT (OUTPATIENT)
Dept: INTERNAL MEDICINE CLINIC | Facility: CLINIC | Age: 25
End: 2025-04-24

## 2025-04-24 VITALS
BODY MASS INDEX: 34.95 KG/M2 | SYSTOLIC BLOOD PRESSURE: 122 MMHG | HEART RATE: 99 BPM | OXYGEN SATURATION: 98 % | HEIGHT: 72 IN | DIASTOLIC BLOOD PRESSURE: 82 MMHG | TEMPERATURE: 98 F | WEIGHT: 258 LBS

## 2025-04-24 DIAGNOSIS — F41.9 ANXIETY: ICD-10-CM

## 2025-04-24 DIAGNOSIS — Z00.00 ANNUAL PHYSICAL EXAM: Primary | ICD-10-CM

## 2025-04-24 DIAGNOSIS — F90.0 ATTENTION DEFICIT HYPERACTIVITY DISORDER (ADHD), PREDOMINANTLY INATTENTIVE TYPE: ICD-10-CM

## 2025-04-24 DIAGNOSIS — R07.89 ATYPICAL CHEST PAIN: ICD-10-CM

## 2025-04-24 DIAGNOSIS — M54.9 CHRONIC NECK AND BACK PAIN: ICD-10-CM

## 2025-04-24 DIAGNOSIS — Z87.09 HISTORY OF PNEUMOTHORAX: ICD-10-CM

## 2025-04-24 DIAGNOSIS — G89.29 CHRONIC NECK AND BACK PAIN: ICD-10-CM

## 2025-04-24 DIAGNOSIS — R79.89 ELEVATED LFTS: ICD-10-CM

## 2025-04-24 DIAGNOSIS — M54.2 CHRONIC NECK AND BACK PAIN: ICD-10-CM

## 2025-04-24 PROCEDURE — 3074F SYST BP LT 130 MM HG: CPT | Performed by: INTERNAL MEDICINE

## 2025-04-24 PROCEDURE — 99395 PREV VISIT EST AGE 18-39: CPT | Performed by: INTERNAL MEDICINE

## 2025-04-24 PROCEDURE — 3079F DIAST BP 80-89 MM HG: CPT | Performed by: INTERNAL MEDICINE

## 2025-04-24 PROCEDURE — 3008F BODY MASS INDEX DOCD: CPT | Performed by: INTERNAL MEDICINE

## 2025-04-24 RX ORDER — CYCLOBENZAPRINE HCL 10 MG
10 TABLET ORAL 3 TIMES DAILY
Qty: 30 TABLET | Refills: 1 | Status: SHIPPED | OUTPATIENT
Start: 2025-04-24 | End: 2025-05-14

## 2025-04-24 RX ORDER — OMEPRAZOLE 40 MG/1
40 CAPSULE, DELAYED RELEASE ORAL DAILY
Qty: 90 CAPSULE | Refills: 0 | Status: SHIPPED | OUTPATIENT
Start: 2025-04-24 | End: 2026-04-19

## 2025-07-03 ENCOUNTER — HOSPITAL ENCOUNTER (OUTPATIENT)
Dept: GENERAL RADIOLOGY | Facility: HOSPITAL | Age: 25
Discharge: HOME | End: 2025-07-03
Attending: INTERNAL MEDICINE
Payer: COMMERCIAL

## 2025-07-03 DIAGNOSIS — M54.9 CHRONIC NECK AND BACK PAIN: ICD-10-CM

## 2025-07-03 DIAGNOSIS — G89.29 CHRONIC NECK AND BACK PAIN: ICD-10-CM

## 2025-07-03 DIAGNOSIS — M54.2 CHRONIC NECK AND BACK PAIN: ICD-10-CM

## 2025-07-03 PROCEDURE — 72072 X-RAY EXAM THORAC SPINE 3VWS: CPT | Performed by: INTERNAL MEDICINE

## 2025-07-03 PROCEDURE — 72040 X-RAY EXAM NECK SPINE 2-3 VW: CPT | Performed by: INTERNAL MEDICINE

## (undated) DEVICE — SUTURE CHROMIC GUT 4-0 FS-2

## (undated) DEVICE — SUCTION CANISTER, 3000CC,SAFELINER: Brand: DEROYAL

## (undated) DEVICE — SOL  .9 1000ML BTL

## (undated) DEVICE — HEAD & NECK: Brand: MEDLINE INDUSTRIES, INC.

## (undated) DEVICE — COVER SGL STRL LGHT HNDL BLU

## (undated) DEVICE — GAMMEX® PI HYBRID SIZE 6.5, STERILE POWDER-FREE SURGICAL GLOVE, POLYISOPRENE AND NEOPRENE BLEND: Brand: GAMMEX

## (undated) DEVICE — REM POLYHESIVE ADULT PATIENT RETURN ELECTRODE: Brand: VALLEYLAB

## (undated) DEVICE — CASED DISP BIPOLAR CORD

## (undated) NOTE — LETTER
8/25/2017              Samantha Smith        [id number] [facility] [id number]         Immunization History   Administered Date(s) Administered   • DTAP 12/07/1998, 02/10/1999, 04/13/1999, 04/26/2000, 10/28/2004   • HEP B/HIB 12/07/1998, 02

## (undated) NOTE — LETTER
4/27/2021          To Whom It May Concern:    Davin Dunham is currently under my medical care and it is with the patient's permission that I provide this correspondence. I diagnosed him with attention deficit hyper activity disorder based on my clinical assessment from 4/2/2021. We are treating his condition medically with medication, namely Vyvanse. If you require additional information please contact our office.         Sincerely,    Iman Plummer MD          Document generated by:  Iman Plummer